# Patient Record
Sex: MALE | Race: WHITE | NOT HISPANIC OR LATINO | URBAN - METROPOLITAN AREA
[De-identification: names, ages, dates, MRNs, and addresses within clinical notes are randomized per-mention and may not be internally consistent; named-entity substitution may affect disease eponyms.]

---

## 2017-05-03 ENCOUNTER — OUTPATIENT (OUTPATIENT)
Dept: OUTPATIENT SERVICES | Facility: HOSPITAL | Age: 52
LOS: 1 days | Discharge: HOME | End: 2017-05-03

## 2017-05-03 DIAGNOSIS — J34.2 DEVIATED NASAL SEPTUM: Chronic | ICD-10-CM

## 2017-06-28 DIAGNOSIS — M54.2 CERVICALGIA: ICD-10-CM

## 2017-11-15 ENCOUNTER — OUTPATIENT (OUTPATIENT)
Dept: OUTPATIENT SERVICES | Facility: HOSPITAL | Age: 52
LOS: 1 days | Discharge: HOME | End: 2017-11-15

## 2017-11-15 DIAGNOSIS — J34.2 DEVIATED NASAL SEPTUM: Chronic | ICD-10-CM

## 2017-11-15 DIAGNOSIS — Z01.818 ENCOUNTER FOR OTHER PREPROCEDURAL EXAMINATION: ICD-10-CM

## 2017-11-15 DIAGNOSIS — M50.30 OTHER CERVICAL DISC DEGENERATION, UNSPECIFIED CERVICAL REGION: ICD-10-CM

## 2017-11-29 ENCOUNTER — INPATIENT (INPATIENT)
Facility: HOSPITAL | Age: 52
LOS: 0 days | Discharge: HOME | End: 2017-11-30
Attending: NEUROLOGICAL SURGERY

## 2017-11-29 DIAGNOSIS — J34.2 DEVIATED NASAL SEPTUM: Chronic | ICD-10-CM

## 2017-11-29 DIAGNOSIS — M50.30 OTHER CERVICAL DISC DEGENERATION, UNSPECIFIED CERVICAL REGION: ICD-10-CM

## 2017-12-03 ENCOUNTER — EMERGENCY (EMERGENCY)
Facility: HOSPITAL | Age: 52
LOS: 0 days | Discharge: HOME | End: 2017-12-03

## 2017-12-03 DIAGNOSIS — G89.18 OTHER ACUTE POSTPROCEDURAL PAIN: ICD-10-CM

## 2017-12-03 DIAGNOSIS — Z98.890 OTHER SPECIFIED POSTPROCEDURAL STATES: ICD-10-CM

## 2017-12-03 DIAGNOSIS — J34.2 DEVIATED NASAL SEPTUM: Chronic | ICD-10-CM

## 2017-12-03 DIAGNOSIS — R06.02 SHORTNESS OF BREATH: ICD-10-CM

## 2017-12-03 DIAGNOSIS — M50.30 OTHER CERVICAL DISC DEGENERATION, UNSPECIFIED CERVICAL REGION: ICD-10-CM

## 2017-12-03 DIAGNOSIS — R07.0 PAIN IN THROAT: ICD-10-CM

## 2017-12-03 DIAGNOSIS — R09.89 OTHER SPECIFIED SYMPTOMS AND SIGNS INVOLVING THE CIRCULATORY AND RESPIRATORY SYSTEMS: ICD-10-CM

## 2017-12-04 DIAGNOSIS — R00.0 TACHYCARDIA, UNSPECIFIED: ICD-10-CM
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
## 2017-12-04 DIAGNOSIS — M50.121 CERVICAL DISC DISORDER AT C4-C5 LEVEL WITH RADICULOPATHY: ICD-10-CM

## 2017-12-04 DIAGNOSIS — M54.2 CERVICALGIA: ICD-10-CM

## 2017-12-14 ENCOUNTER — EMERGENCY (EMERGENCY)
Facility: HOSPITAL | Age: 52
LOS: 0 days | Discharge: HOME | End: 2017-12-14

## 2017-12-14 DIAGNOSIS — R10.9 UNSPECIFIED ABDOMINAL PAIN: ICD-10-CM

## 2017-12-14 DIAGNOSIS — M50.30 OTHER CERVICAL DISC DEGENERATION, UNSPECIFIED CERVICAL REGION: ICD-10-CM

## 2017-12-14 DIAGNOSIS — K80.20 CALCULUS OF GALLBLADDER WITHOUT CHOLECYSTITIS WITHOUT OBSTRUCTION: ICD-10-CM

## 2017-12-14 DIAGNOSIS — J34.2 DEVIATED NASAL SEPTUM: Chronic | ICD-10-CM

## 2017-12-14 DIAGNOSIS — Z98.890 OTHER SPECIFIED POSTPROCEDURAL STATES: ICD-10-CM

## 2018-01-25 ENCOUNTER — OUTPATIENT (OUTPATIENT)
Dept: OUTPATIENT SERVICES | Facility: HOSPITAL | Age: 53
LOS: 1 days | Discharge: HOME | End: 2018-01-25

## 2018-01-25 DIAGNOSIS — J34.2 DEVIATED NASAL SEPTUM: Chronic | ICD-10-CM

## 2018-01-25 DIAGNOSIS — M54.2 CERVICALGIA: ICD-10-CM

## 2018-02-04 DIAGNOSIS — M50.30 OTHER CERVICAL DISC DEGENERATION, UNSPECIFIED CERVICAL REGION: ICD-10-CM

## 2018-02-26 ENCOUNTER — OUTPATIENT (OUTPATIENT)
Dept: OUTPATIENT SERVICES | Facility: HOSPITAL | Age: 53
LOS: 1 days | Discharge: HOME | End: 2018-02-26

## 2018-02-26 DIAGNOSIS — J34.2 DEVIATED NASAL SEPTUM: Chronic | ICD-10-CM

## 2018-02-26 DIAGNOSIS — M25.559 PAIN IN UNSPECIFIED HIP: ICD-10-CM

## 2018-03-11 ENCOUNTER — OUTPATIENT (OUTPATIENT)
Dept: OUTPATIENT SERVICES | Facility: HOSPITAL | Age: 53
LOS: 1 days | Discharge: HOME | End: 2018-03-11

## 2018-03-11 DIAGNOSIS — J18.9 PNEUMONIA, UNSPECIFIED ORGANISM: ICD-10-CM

## 2018-03-11 DIAGNOSIS — J34.2 DEVIATED NASAL SEPTUM: Chronic | ICD-10-CM

## 2018-03-25 ENCOUNTER — OUTPATIENT (OUTPATIENT)
Dept: OUTPATIENT SERVICES | Facility: HOSPITAL | Age: 53
LOS: 1 days | Discharge: HOME | End: 2018-03-25

## 2018-03-25 DIAGNOSIS — J34.2 DEVIATED NASAL SEPTUM: Chronic | ICD-10-CM

## 2018-03-25 DIAGNOSIS — J18.9 PNEUMONIA, UNSPECIFIED ORGANISM: ICD-10-CM

## 2018-04-10 ENCOUNTER — OUTPATIENT (OUTPATIENT)
Dept: OUTPATIENT SERVICES | Facility: HOSPITAL | Age: 53
LOS: 1 days | Discharge: HOME | End: 2018-04-10

## 2018-04-10 ENCOUNTER — APPOINTMENT (OUTPATIENT)
Dept: PULMONOLOGY | Facility: CLINIC | Age: 53
End: 2018-04-10

## 2018-04-10 VITALS
WEIGHT: 257 LBS | SYSTOLIC BLOOD PRESSURE: 140 MMHG | BODY MASS INDEX: 31.95 KG/M2 | HEART RATE: 89 BPM | OXYGEN SATURATION: 90 % | HEIGHT: 75 IN | DIASTOLIC BLOOD PRESSURE: 90 MMHG

## 2018-04-10 DIAGNOSIS — R05 COUGH: ICD-10-CM

## 2018-04-10 DIAGNOSIS — J34.2 DEVIATED NASAL SEPTUM: Chronic | ICD-10-CM

## 2018-04-10 RX ORDER — MELOXICAM 15 MG/1
15 TABLET ORAL
Qty: 30 | Refills: 0 | Status: COMPLETED | COMMUNITY
Start: 2017-12-06

## 2018-04-10 RX ORDER — PROMETHAZINE HYDROCHLORIDE AND DEXTROMETHORPHAN HYDROBROMIDE ORAL SOLUTION 15; 6.25 MG/5ML; MG/5ML
6.25-15 SOLUTION ORAL
Qty: 120 | Refills: 0 | Status: COMPLETED | COMMUNITY
Start: 2018-02-26

## 2018-04-10 RX ORDER — DIAZEPAM 5 MG/1
5 TABLET ORAL
Qty: 2 | Refills: 0 | Status: COMPLETED | COMMUNITY
Start: 2018-01-24

## 2018-04-10 RX ORDER — OXYCODONE AND ACETAMINOPHEN 5; 325 MG/1; MG/1
5-325 TABLET ORAL
Qty: 30 | Refills: 0 | Status: COMPLETED | COMMUNITY
Start: 2017-11-30

## 2018-04-10 RX ORDER — LORAZEPAM 0.5 MG/1
0.5 TABLET ORAL
Qty: 30 | Refills: 0 | Status: COMPLETED | COMMUNITY
Start: 2017-12-04

## 2018-04-10 RX ORDER — PROMETHAZINE HYDROCHLORIDE AND CODEINE PHOSPHATE 6.25; 1 MG/5ML; MG/5ML
6.25-1 SOLUTION ORAL
Qty: 180 | Refills: 0 | Status: COMPLETED | COMMUNITY
Start: 2018-03-14

## 2018-04-10 RX ORDER — METHYLPREDNISOLONE 4 MG/1
4 TABLET ORAL
Qty: 21 | Refills: 0 | Status: COMPLETED | COMMUNITY
Start: 2017-12-03

## 2018-04-10 RX ORDER — BENZONATATE 200 MG/1
200 CAPSULE ORAL
Qty: 20 | Refills: 0 | Status: COMPLETED | COMMUNITY
Start: 2018-03-25

## 2018-04-10 RX ORDER — CLINDAMYCIN HYDROCHLORIDE 300 MG/1
300 CAPSULE ORAL
Qty: 30 | Refills: 0 | Status: COMPLETED | COMMUNITY
Start: 2017-11-06

## 2018-04-12 RX ORDER — CLARITHROMYCIN 500 MG/1
500 TABLET, FILM COATED ORAL
Qty: 20 | Refills: 0 | Status: COMPLETED | COMMUNITY
Start: 2018-03-11

## 2018-04-12 RX ORDER — LEVOFLOXACIN 500 MG/1
500 TABLET, FILM COATED ORAL
Qty: 10 | Refills: 0 | Status: COMPLETED | COMMUNITY
Start: 2018-03-25

## 2018-04-17 ENCOUNTER — OUTPATIENT (OUTPATIENT)
Dept: OUTPATIENT SERVICES | Facility: HOSPITAL | Age: 53
LOS: 1 days | Discharge: HOME | End: 2018-04-17

## 2018-04-17 DIAGNOSIS — J34.2 DEVIATED NASAL SEPTUM: Chronic | ICD-10-CM

## 2018-04-17 DIAGNOSIS — R05 COUGH: ICD-10-CM

## 2018-04-17 DIAGNOSIS — M54.2 CERVICALGIA: ICD-10-CM

## 2018-05-17 ENCOUNTER — OUTPATIENT (OUTPATIENT)
Dept: OUTPATIENT SERVICES | Facility: HOSPITAL | Age: 53
LOS: 1 days | Discharge: HOME | End: 2018-05-17

## 2018-05-17 DIAGNOSIS — K22.4 DYSKINESIA OF ESOPHAGUS: ICD-10-CM

## 2018-05-17 DIAGNOSIS — J34.2 DEVIATED NASAL SEPTUM: Chronic | ICD-10-CM

## 2018-06-12 ENCOUNTER — OUTPATIENT (OUTPATIENT)
Dept: OUTPATIENT SERVICES | Facility: HOSPITAL | Age: 53
LOS: 1 days | Discharge: HOME | End: 2018-06-12

## 2018-06-12 DIAGNOSIS — R13.0 APHAGIA: ICD-10-CM

## 2018-06-12 DIAGNOSIS — J34.2 DEVIATED NASAL SEPTUM: Chronic | ICD-10-CM

## 2018-06-18 ENCOUNTER — INPATIENT (INPATIENT)
Facility: HOSPITAL | Age: 53
LOS: 3 days | Discharge: HOME | End: 2018-06-22
Attending: INTERNAL MEDICINE | Admitting: INTERNAL MEDICINE

## 2018-06-18 VITALS
WEIGHT: 253.53 LBS | RESPIRATION RATE: 16 BRPM | SYSTOLIC BLOOD PRESSURE: 105 MMHG | OXYGEN SATURATION: 100 % | HEART RATE: 113 BPM | DIASTOLIC BLOOD PRESSURE: 72 MMHG | HEIGHT: 72 IN

## 2018-06-18 DIAGNOSIS — J34.2 DEVIATED NASAL SEPTUM: Chronic | ICD-10-CM

## 2018-06-18 LAB
ALBUMIN SERPL ELPH-MCNC: 4.2 G/DL — SIGNIFICANT CHANGE UP (ref 3.5–5.2)
ALBUMIN SERPL ELPH-MCNC: 4.8 G/DL — SIGNIFICANT CHANGE UP (ref 3.5–5.2)
ALP SERPL-CCNC: 77 U/L — SIGNIFICANT CHANGE UP (ref 30–115)
ALP SERPL-CCNC: 92 U/L — SIGNIFICANT CHANGE UP (ref 30–115)
ALT FLD-CCNC: 34 U/L — SIGNIFICANT CHANGE UP (ref 0–41)
ALT FLD-CCNC: 40 U/L — SIGNIFICANT CHANGE UP (ref 0–41)
ANION GAP SERPL CALC-SCNC: 17 MMOL/L — HIGH (ref 7–14)
ANION GAP SERPL CALC-SCNC: 25 MMOL/L — HIGH (ref 7–14)
APTT BLD: 40.6 SEC — HIGH (ref 27–39.2)
AST SERPL-CCNC: 22 U/L — SIGNIFICANT CHANGE UP (ref 0–41)
AST SERPL-CCNC: 25 U/L — SIGNIFICANT CHANGE UP (ref 0–41)
BASE EXCESS BLDV CALC-SCNC: -5 MMOL/L — LOW (ref -2–2)
BASOPHILS # BLD AUTO: 0.07 K/UL — SIGNIFICANT CHANGE UP (ref 0–0.2)
BASOPHILS NFR BLD AUTO: 0.6 % — SIGNIFICANT CHANGE UP (ref 0–1)
BILIRUB SERPL-MCNC: 0.5 MG/DL — SIGNIFICANT CHANGE UP (ref 0.2–1.2)
BILIRUB SERPL-MCNC: 0.8 MG/DL — SIGNIFICANT CHANGE UP (ref 0.2–1.2)
BUN SERPL-MCNC: 17 MG/DL — SIGNIFICANT CHANGE UP (ref 10–20)
BUN SERPL-MCNC: 19 MG/DL — SIGNIFICANT CHANGE UP (ref 10–20)
CA-I SERPL-SCNC: 1.22 MMOL/L — SIGNIFICANT CHANGE UP (ref 1.12–1.3)
CALCIUM SERPL-MCNC: 8.9 MG/DL — SIGNIFICANT CHANGE UP (ref 8.5–10.1)
CALCIUM SERPL-MCNC: 9.8 MG/DL — SIGNIFICANT CHANGE UP (ref 8.5–10.1)
CHLORIDE SERPL-SCNC: 102 MMOL/L — SIGNIFICANT CHANGE UP (ref 98–110)
CHLORIDE SERPL-SCNC: 98 MMOL/L — SIGNIFICANT CHANGE UP (ref 98–110)
CHOLEST SERPL-MCNC: 215 MG/DL — HIGH (ref 100–200)
CK MB CFR SERPL CALC: 1.6 NG/ML — SIGNIFICANT CHANGE UP (ref 0.6–6.3)
CK MB CFR SERPL CALC: 2 NG/ML — SIGNIFICANT CHANGE UP (ref 0.6–6.3)
CK SERPL-CCNC: 101 U/L — SIGNIFICANT CHANGE UP (ref 0–225)
CK SERPL-CCNC: 101 U/L — SIGNIFICANT CHANGE UP (ref 0–225)
CO2 SERPL-SCNC: 16 MMOL/L — LOW (ref 17–32)
CO2 SERPL-SCNC: 22 MMOL/L — SIGNIFICANT CHANGE UP (ref 17–32)
CREAT SERPL-MCNC: 1.1 MG/DL — SIGNIFICANT CHANGE UP (ref 0.7–1.5)
CREAT SERPL-MCNC: 1.3 MG/DL — SIGNIFICANT CHANGE UP (ref 0.7–1.5)
EOSINOPHIL # BLD AUTO: 0.11 K/UL — SIGNIFICANT CHANGE UP (ref 0–0.7)
EOSINOPHIL NFR BLD AUTO: 0.9 % — SIGNIFICANT CHANGE UP (ref 0–8)
GAS PNL BLDV: 139 MMOL/L — SIGNIFICANT CHANGE UP (ref 136–145)
GAS PNL BLDV: SIGNIFICANT CHANGE UP
GLUCOSE SERPL-MCNC: 119 MG/DL — HIGH (ref 70–99)
GLUCOSE SERPL-MCNC: 80 MG/DL — SIGNIFICANT CHANGE UP (ref 70–99)
HCO3 BLDV-SCNC: 18 MMOL/L — LOW (ref 22–29)
HCT VFR BLD CALC: 49.2 % — SIGNIFICANT CHANGE UP (ref 42–52)
HCT VFR BLDA CALC: 56.4 % — HIGH (ref 34–44)
HDLC SERPL-MCNC: 35 MG/DL — LOW (ref 40–125)
HGB BLD CALC-MCNC: 18.4 G/DL — HIGH (ref 14–18)
HGB BLD-MCNC: 17.3 G/DL — SIGNIFICANT CHANGE UP (ref 14–18)
IMM GRANULOCYTES NFR BLD AUTO: 0.6 % — HIGH (ref 0.1–0.3)
INR BLD: 1.19 RATIO — SIGNIFICANT CHANGE UP (ref 0.65–1.3)
LACTATE BLDV-MCNC: 2 MMOL/L — HIGH (ref 0.5–1.6)
LIPID PNL WITH DIRECT LDL SERPL: 161 MG/DL — HIGH (ref 4–129)
LYMPHOCYTES # BLD AUTO: 18.6 % — LOW (ref 20.5–51.1)
LYMPHOCYTES # BLD AUTO: 2.27 K/UL — SIGNIFICANT CHANGE UP (ref 1.2–3.4)
MCHC RBC-ENTMCNC: 29.9 PG — SIGNIFICANT CHANGE UP (ref 27–31)
MCHC RBC-ENTMCNC: 35.2 G/DL — SIGNIFICANT CHANGE UP (ref 32–37)
MCV RBC AUTO: 85.1 FL — SIGNIFICANT CHANGE UP (ref 80–94)
MONOCYTES # BLD AUTO: 1.06 K/UL — HIGH (ref 0.1–0.6)
MONOCYTES NFR BLD AUTO: 8.7 % — SIGNIFICANT CHANGE UP (ref 1.7–9.3)
NEUTROPHILS # BLD AUTO: 8.62 K/UL — HIGH (ref 1.4–6.5)
NEUTROPHILS NFR BLD AUTO: 70.6 % — SIGNIFICANT CHANGE UP (ref 42.2–75.2)
NRBC # BLD: 0 /100 WBCS — SIGNIFICANT CHANGE UP (ref 0–0)
PCO2 BLDV: 27 MMHG — LOW (ref 41–51)
PH BLDV: 7.42 — SIGNIFICANT CHANGE UP (ref 7.26–7.43)
PLATELET # BLD AUTO: 473 K/UL — HIGH (ref 130–400)
PO2 BLDV: 27 MMHG — SIGNIFICANT CHANGE UP (ref 20–40)
POTASSIUM BLDV-SCNC: 4.4 MMOL/L — SIGNIFICANT CHANGE UP (ref 3.3–5.6)
POTASSIUM SERPL-MCNC: 4.7 MMOL/L — SIGNIFICANT CHANGE UP (ref 3.5–5)
POTASSIUM SERPL-MCNC: 4.8 MMOL/L — SIGNIFICANT CHANGE UP (ref 3.5–5)
POTASSIUM SERPL-SCNC: 4.7 MMOL/L — SIGNIFICANT CHANGE UP (ref 3.5–5)
POTASSIUM SERPL-SCNC: 4.8 MMOL/L — SIGNIFICANT CHANGE UP (ref 3.5–5)
PROT SERPL-MCNC: 7.2 G/DL — SIGNIFICANT CHANGE UP (ref 6–8)
PROT SERPL-MCNC: 8.2 G/DL — HIGH (ref 6–8)
PROTHROM AB SERPL-ACNC: 12.9 SEC — HIGH (ref 9.95–12.87)
RBC # BLD: 5.78 M/UL — SIGNIFICANT CHANGE UP (ref 4.7–6.1)
RBC # FLD: 12.9 % — SIGNIFICANT CHANGE UP (ref 11.5–14.5)
SAO2 % BLDV: 53 % — SIGNIFICANT CHANGE UP
SODIUM SERPL-SCNC: 139 MMOL/L — SIGNIFICANT CHANGE UP (ref 135–146)
SODIUM SERPL-SCNC: 141 MMOL/L — SIGNIFICANT CHANGE UP (ref 135–146)
TOTAL CHOLESTEROL/HDL RATIO MEASUREMENT: 6.1 RATIO — HIGH (ref 4–5.5)
TRIGL SERPL-MCNC: 73 MG/DL — SIGNIFICANT CHANGE UP (ref 10–149)
TROPONIN T SERPL-MCNC: <0.01 NG/ML — SIGNIFICANT CHANGE UP
TROPONIN T SERPL-MCNC: <0.01 NG/ML — SIGNIFICANT CHANGE UP
WBC # BLD: 12.2 K/UL — HIGH (ref 4.8–10.8)
WBC # FLD AUTO: 12.2 K/UL — HIGH (ref 4.8–10.8)

## 2018-06-18 RX ORDER — ENOXAPARIN SODIUM 100 MG/ML
40 INJECTION SUBCUTANEOUS EVERY 24 HOURS
Qty: 0 | Refills: 0 | Status: DISCONTINUED | OUTPATIENT
Start: 2018-06-18 | End: 2018-06-22

## 2018-06-18 RX ORDER — METHOCARBAMOL 500 MG/1
0 TABLET, FILM COATED ORAL
Qty: 0 | Refills: 0 | COMMUNITY

## 2018-06-18 RX ORDER — ACETAMINOPHEN 500 MG
650 TABLET ORAL EVERY 4 HOURS
Qty: 0 | Refills: 0 | Status: DISCONTINUED | OUTPATIENT
Start: 2018-06-18 | End: 2018-06-22

## 2018-06-18 RX ORDER — METHOCARBAMOL 500 MG/1
750 TABLET, FILM COATED ORAL THREE TIMES A DAY
Qty: 0 | Refills: 0 | Status: DISCONTINUED | OUTPATIENT
Start: 2018-06-18 | End: 2018-06-22

## 2018-06-18 RX ORDER — ASPIRIN/CALCIUM CARB/MAGNESIUM 324 MG
81 TABLET ORAL DAILY
Qty: 0 | Refills: 0 | Status: DISCONTINUED | OUTPATIENT
Start: 2018-06-18 | End: 2018-06-22

## 2018-06-18 NOTE — H&P ADULT - ATTENDING COMMENTS
Patient seen and examined independently. Agree with resident note/ history / physical exam and plan of care with following exceptions. Case discussed with house-staff, nursing and patient  pt is feeling better today  plan ccta  follow up cardio

## 2018-06-18 NOTE — H&P ADULT - NSHPLABSRESULTS_GEN_ALL_CORE
17.3   12.20 )-----------( 473      ( 18 Jun 2018 15:21 )             49.2       06-18    139  |  98  |  19  ----------------------------<  119<H>  4.8   |  16<L>  |  1.3    Ca    9.8      18 Jun 2018 15:21    TPro  8.2<H>  /  Alb  4.8  /  TBili  0.8  /  DBili  x   /  AST  25  /  ALT  40  /  AlkPhos  92  06-18            PT/INR - ( 18 Jun 2018 15:21 )   PT: 12.90 sec;   INR: 1.19 ratio    PTT - ( 18 Jun 2018 15:21 )  PTT:40.6 sec    Blood Gas Profile - Venous (06.18.18 @ 15:51)    pH, Venous: 7.42    pCO2, Venous: 27 mmHg    pO2, Venous: 27 mmHg    HCO3, Venous: 18 mmoL/L    Base Excess, Venous: -5.0 mmoL/L    Oxygen Saturation, Venous: 53 %    Blood Gas Venous - Lactate: 2.0 mmoL/L (06.18.18 @ 15:51)    CARDIAC MARKERS ( 18 Jun 2018 16:34 )  x     / <0.01 ng/mL / 101 U/L / x     / 1.6 ng/mL

## 2018-06-18 NOTE — H&P ADULT - NSHPSOCIALHISTORY_GEN_ALL_CORE
Marital Status:  (  x )    (   ) Single    (   )    (  )   Lives with: (  ) alone  (  ) children   ( x ) spouse   (  ) parents  (  ) other  Recent Travel: None    Substance Use (street drugs): ( x ) never used  (  ) other:  Tobacco Usage:  ( x  ) never smoked   (   ) former smoker   (   ) current smoker  (     ) pack year  Alcohol Usage: Denied

## 2018-06-18 NOTE — ED PROVIDER NOTE - CARE PLAN
Principal Discharge DX:	SVT (supraventricular tachycardia)  Secondary Diagnosis:	Chest pain  Secondary Diagnosis:	Shortness of breath

## 2018-06-18 NOTE — H&P ADULT - NSHPPHYSICALEXAM_GEN_ALL_CORE
Vital Signs Last 24 Hrs  T(C): 36.7 (18 Jun 2018 15:37), Max: 36.7 (18 Jun 2018 15:37)  T(F): 98 (18 Jun 2018 15:37), Max: 98 (18 Jun 2018 15:37)  HR: 93 (18 Jun 2018 18:15) (93 - 128)  BP: 108/68 (18 Jun 2018 18:15) (105/72 - 108/68)  BP(mean): --  RR: 20 (18 Jun 2018 18:15) (16 - 20)  SpO2: 98% (18 Jun 2018 18:15) (98% - 100%)      GENERAL: NAD, well-developed, Non-toxic, stated age   HEAD:  Atraumatic, Normocephalic  EYES: EOMI, conjunctiva and sclera clear  NECK: Supple, No JVD  CHEST/LUNG: mild inspiratory L chest wheezing   HEART: Regular rate and rhythm; s1, s2, No murmurs, rubs, or gallops  ABDOMEN: Soft, Nontender, Nondistended; Bowel sounds present, No rebound or guarding noted   EXTREMITIES:  No lower extremity edema or calf tenderness to palpation.  No clubbing or cyanosis  PSYCH: AAOx3  NEUROLOGY: non-focal  SKIN: No rashes or lesions

## 2018-06-18 NOTE — ED PROVIDER NOTE - PROGRESS NOTE DETAILS
ATTENDING NOTE:  52-year-old male history of intervertebral disc disease, anxiety now presents with chest pain. Patient states he began to have midsternal chest discomfort while exerting himself earlier today, pain got progressively worse, [place], denies radiation, denies modifying factors, associated palpitations, pallor and diaphoresis, denies other associated complaints at present. EMS was called and the patient was found to be in SVT which responded to 18 mg of adenosine and the patient was transported to the ED. , otherwise VSS, awake, alert, non-toxic appearing, sitting comfortably in stretcher, in NAD, ears clear, oropharynx clear, mmm, no JVD or bruit, no skin rash or lesions, chest CTAB, non-labored breathing, no w/r/r, +S1/S2, tachycardic with regular rhythm, no m/r/g, abdomen soft, NT, ND, +BS, no organomegaly or pulsatile masses, no peripheral edema or deformities, equal pulses upper and lower extremities, alert, clear speech and steady gait.

## 2018-06-18 NOTE — H&P ADULT - ASSESSMENT
52M w/no significant past medical history presents with sudden acute chest pain, palpitations, found to be in SVT.    SVT: Unclear etiology at this time. ?ischemic cause   Broke with Adenosine  Continue with tele  AM ekg    Chest Pain and palpitations: Atypical Chest pain, r.o acs  Will order stress test   Cardiology consult  Start ASA 81mg     Open anion gap and decreased Bicarb:   Will repeat BMP to make sure values are accurate     gi ppx: Not indicated  DVT ppx: Lovenox  Diet 52M w/no significant past medical history presents with sudden acute chest pain, palpitations, found to be in SVT.    SVT: Unclear etiology at this time. ?ischemic cause   Broke with Adenosine  Continue with tele  AM ekg    Chest Pain and palpitations: Atypical Chest pain, r.o acs  Will order stress test   Cardiology consult  Start ASA 81mg   Echo     Open anion gap and decreased Bicarb:   Will repeat BMP to make sure values are accurate     gi ppx: Not indicated  DVT ppx: Lovenox  Diet

## 2018-06-18 NOTE — H&P ADULT - HISTORY OF PRESENT ILLNESS
52M with Cevical Disc herniation presents from doctors office ofr chest pain and palpitations. Patient states he awoke this morning in this normal state of health. He had intercourse with his wife, after wards in the shower he began having substernal, non-radiating chest pain, progressive in intensity, pressure like associated with sweating. Nothing made the pain better. Patient initially thought it was anxiety but it worsened significantly when at his wife's doctor's office. EKG in the office showed SVT ~220, EMS had given the patient 6 and 12mg of adenosine which reduced his rate to 110. Patient currently states he stilll has a feeling of chest tightness though he feels significantly better than previously.   Patient had an episode of Pneumonia ~1 month ago which has resolved. Admits to mild sore throat and slight productive cough. No fever, chills, n/v/d loss of consciousness.   Family history is significant for extensive CAD/MI and heart disease on fathers side of the family. Many  younger, father lived to 62 though  of leukemia, not heart disease.

## 2018-06-18 NOTE — H&P ADULT - FAMILY HISTORY
Father  Still living? No  Family history of leukemia, Age at diagnosis: Age Unknown     Mother  Still living? Yes, Estimated age: Age Unknown  Family history of chronic obstructive lung disease, Age at diagnosis: Age Unknown     Aunt  Still living? No  Family history of early CAD, Age at diagnosis: Age Unknown

## 2018-06-18 NOTE — ED PROVIDER NOTE - OBJECTIVE STATEMENT
53 yo M with no pmhx, presents for evaluation of chest pain, onset today during intercourse, worsening PTA, associated with shortness of breath. NO fever, no chills, no headache, no nausea, no vomiting, no back pain. Patient states that today he was having sexual intercourse when he started having the chest pain, he let it go and then he was accompanying his wife to the doctor. At that time patient began to have worsening chest pain and had an EKG done at the office which showed SVT at 220 bmp. Patient was given 6, 12 of adenosine by EMS which brought the patient into 120. Patient denies any other symptoms.

## 2018-06-18 NOTE — H&P ADULT - NSHPREVIEWOFSYSTEMS_GEN_ALL_CORE
CONSTITUTIONAL: No weakness, fevers or chills  EYES/ENT: No visual changes;  No vertigo or throat pain   NECK: No pain or stiffness  RESPIRATORY: See HPI   CARDIOVASCULAR: See HPI   GASTROINTESTINAL: No abdominal or epigastric pain. No nausea, vomiting, or hematemesis; No diarrhea or constipation. No melena or hematochezia.  GENITOURINARY: No dysuria, frequency or hematuria  NEUROLOGICAL: No numbness or weakness  MUSCULOSKELETAL: No myalgias, arthralgias, or joint swelling  SKIN: No itching, rashes

## 2018-06-19 LAB
ANION GAP SERPL CALC-SCNC: 18 MMOL/L — HIGH (ref 7–14)
BASOPHILS # BLD AUTO: 0.09 K/UL — SIGNIFICANT CHANGE UP (ref 0–0.2)
BASOPHILS NFR BLD AUTO: 0.8 % — SIGNIFICANT CHANGE UP (ref 0–1)
BUN SERPL-MCNC: 16 MG/DL — SIGNIFICANT CHANGE UP (ref 10–20)
CALCIUM SERPL-MCNC: 8.9 MG/DL — SIGNIFICANT CHANGE UP (ref 8.5–10.1)
CHLORIDE SERPL-SCNC: 102 MMOL/L — SIGNIFICANT CHANGE UP (ref 98–110)
CK MB CFR SERPL CALC: 2 NG/ML — SIGNIFICANT CHANGE UP (ref 0.6–6.3)
CK SERPL-CCNC: 85 U/L — SIGNIFICANT CHANGE UP (ref 0–225)
CO2 SERPL-SCNC: 20 MMOL/L — SIGNIFICANT CHANGE UP (ref 17–32)
CREAT SERPL-MCNC: 1 MG/DL — SIGNIFICANT CHANGE UP (ref 0.7–1.5)
EOSINOPHIL # BLD AUTO: 0.14 K/UL — SIGNIFICANT CHANGE UP (ref 0–0.7)
EOSINOPHIL NFR BLD AUTO: 1.2 % — SIGNIFICANT CHANGE UP (ref 0–8)
GLUCOSE SERPL-MCNC: 97 MG/DL — SIGNIFICANT CHANGE UP (ref 70–99)
HCT VFR BLD CALC: 44.4 % — SIGNIFICANT CHANGE UP (ref 42–52)
HGB BLD-MCNC: 15.4 G/DL — SIGNIFICANT CHANGE UP (ref 14–18)
IMM GRANULOCYTES NFR BLD AUTO: 0.5 % — HIGH (ref 0.1–0.3)
LYMPHOCYTES # BLD AUTO: 1.87 K/UL — SIGNIFICANT CHANGE UP (ref 1.2–3.4)
LYMPHOCYTES # BLD AUTO: 16.5 % — LOW (ref 20.5–51.1)
MCHC RBC-ENTMCNC: 30.1 PG — SIGNIFICANT CHANGE UP (ref 27–31)
MCHC RBC-ENTMCNC: 34.7 G/DL — SIGNIFICANT CHANGE UP (ref 32–37)
MCV RBC AUTO: 86.7 FL — SIGNIFICANT CHANGE UP (ref 80–94)
MONOCYTES # BLD AUTO: 1.05 K/UL — HIGH (ref 0.1–0.6)
MONOCYTES NFR BLD AUTO: 9.3 % — SIGNIFICANT CHANGE UP (ref 1.7–9.3)
NEUTROPHILS # BLD AUTO: 8.14 K/UL — HIGH (ref 1.4–6.5)
NEUTROPHILS NFR BLD AUTO: 71.7 % — SIGNIFICANT CHANGE UP (ref 42.2–75.2)
PLATELET # BLD AUTO: 415 K/UL — HIGH (ref 130–400)
POTASSIUM SERPL-MCNC: 4.7 MMOL/L — SIGNIFICANT CHANGE UP (ref 3.5–5)
POTASSIUM SERPL-SCNC: 4.7 MMOL/L — SIGNIFICANT CHANGE UP (ref 3.5–5)
RBC # BLD: 5.12 M/UL — SIGNIFICANT CHANGE UP (ref 4.7–6.1)
RBC # FLD: 13 % — SIGNIFICANT CHANGE UP (ref 11.5–14.5)
SODIUM SERPL-SCNC: 140 MMOL/L — SIGNIFICANT CHANGE UP (ref 135–146)
T3 SERPL-MCNC: 105 NG/DL — SIGNIFICANT CHANGE UP (ref 80–200)
T4 AB SER-ACNC: 8.1 UG/DL — SIGNIFICANT CHANGE UP (ref 4.6–12)
TROPONIN T SERPL-MCNC: <0.01 NG/ML — SIGNIFICANT CHANGE UP
TSH SERPL-MCNC: 2.27 UIU/ML — SIGNIFICANT CHANGE UP (ref 0.27–4.2)
WBC # BLD: 11.35 K/UL — HIGH (ref 4.8–10.8)
WBC # FLD AUTO: 11.35 K/UL — HIGH (ref 4.8–10.8)

## 2018-06-19 RX ORDER — BENZOCAINE AND MENTHOL 5; 1 G/100ML; G/100ML
1 LIQUID ORAL
Qty: 0 | Refills: 0 | Status: DISCONTINUED | OUTPATIENT
Start: 2018-06-19 | End: 2018-06-22

## 2018-06-19 RX ORDER — SODIUM CHLORIDE 9 MG/ML
1000 INJECTION INTRAMUSCULAR; INTRAVENOUS; SUBCUTANEOUS
Qty: 0 | Refills: 0 | Status: DISCONTINUED | OUTPATIENT
Start: 2018-06-19 | End: 2018-06-20

## 2018-06-19 RX ORDER — VERAPAMIL HCL 240 MG
180 CAPSULE, EXTENDED RELEASE PELLETS 24 HR ORAL DAILY
Qty: 0 | Refills: 0 | Status: DISCONTINUED | OUTPATIENT
Start: 2018-06-19 | End: 2018-06-22

## 2018-06-19 RX ORDER — METOPROLOL TARTRATE 50 MG
5 TABLET ORAL ONCE
Qty: 0 | Refills: 0 | Status: COMPLETED | OUTPATIENT
Start: 2018-06-19 | End: 2018-06-19

## 2018-06-19 RX ADMIN — ENOXAPARIN SODIUM 40 MILLIGRAM(S): 100 INJECTION SUBCUTANEOUS at 06:10

## 2018-06-19 RX ADMIN — SODIUM CHLORIDE 75 MILLILITER(S): 9 INJECTION INTRAMUSCULAR; INTRAVENOUS; SUBCUTANEOUS at 14:42

## 2018-06-19 RX ADMIN — Medication 5 MILLIGRAM(S): at 11:13

## 2018-06-19 RX ADMIN — Medication 180 MILLIGRAM(S): at 14:42

## 2018-06-19 RX ADMIN — BENZOCAINE AND MENTHOL 1 LOZENGE: 5; 1 LIQUID ORAL at 23:59

## 2018-06-19 RX ADMIN — METHOCARBAMOL 750 MILLIGRAM(S): 500 TABLET, FILM COATED ORAL at 11:17

## 2018-06-19 RX ADMIN — Medication 81 MILLIGRAM(S): at 11:13

## 2018-06-19 NOTE — CONSULT NOTE ADULT - ASSESSMENT
Supraventricular tachycardia  - likely AVNRT by ekg analysis  - patient open to ablation  - will give verapamil SR upon discharge  - CCTA to define coronaries as patient had some pressure during episodes  - Electrophysiology evaluation  - 2d echo reviewed

## 2018-06-19 NOTE — CONSULT NOTE ADULT - SUBJECTIVE AND OBJECTIVE BOX
Date of Admission: 6/18    CHIEF COMPLAINT: palpitations/ chest pain    HISTORY OF PRESENT ILLNESS: 52yMale with PMH below presented to the hospital for rapid heart rate found at his [facility] doctors office. He was found to have a HR of 220 after having intercourse with his wife and started to feel some chest tightness and some fast heart rate. he thought he wwas having a panic attack and thought he would pass out. Has felt palpitations before this incident. En route to hospital had adenosine which broke his SVT. NO excessive snoring, no excessive coffee intake.     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 650 milliGRAM(s) Oral every 4 hours PRN Mild Pain (1 - 3)  methocarbamol 750 milliGRAM(s) Oral three times a day PRN muscle pain              SOCIAL HISTORY:    [ ] Non-smoker  [ ] Smoker  [ ] Alcohol    PHYSICAL EXAM:  T(C): 36.8 (06-18-18 @ 23:32), Max: 36.8 (06-18-18 @ 23:32)  HR: 93 (06-18-18 @ 23:32) (92 - 128)  BP: 131/71 (06-18-18 @ 23:32) (105/72 - 155/73)  RR: 18 (06-18-18 @ 23:32) (16 - 20)  SpO2: 98% (06-18-18 @ 23:32) (98% - 100%)  Wt(kg): --  I&O's Summary    Daily Height in cm: 182.88 (18 Jun 2018 15:07)    Daily     General Appearance: Normal, poor dentition  Cardiovascular: Normal S1 S2, No JVD, No murmurs, No edema  Respiratory: Lungs clear to auscultation	  Psychiatry: A & O x 3, Mood & affect appropriate  Gastrointestinal:  Soft, Non-tender  Skin: No rashes, No ecchymoses, No cyanosis	  Neurologic: Non-focal  Extremities: Normal range of motion, No clubbing, cyanosis or edema  Vascular: Peripheral pulses palpable 2+ bilaterally        LABS:	 	                          15.4   11.35 )-----------( 415      ( 19 Jun 2018 08:25 )             44.4     06-19    140  |  102  |  16  ----------------------------<  97  4.7   |  20  |  1.0    Ca    8.9      19 Jun 2018 08:25    TPro  7.2  /  Alb  4.2  /  TBili  0.5  /  DBili  x   /  AST  22  /  ALT  34  /  AlkPhos  77  06-18    CARDIAC MARKERS ( 19 Jun 2018 00:21 )  x     / <0.01 ng/mL / 85 U/L / x     / 2.0 ng/mL  CARDIAC MARKERS ( 18 Jun 2018 21:39 )  x     / <0.01 ng/mL / 101 U/L / x     / 2.0 ng/mL  CARDIAC MARKERS ( 18 Jun 2018 16:34 )  x     / <0.01 ng/mL / 101 U/L / x     / 1.6 ng/mL      PT/INR - ( 18 Jun 2018 15:21 )   PT: 12.90 sec;   INR: 1.19 ratio         PTT - ( 18 Jun 2018 15:21 )  PTT:40.6 sec        TELEMETRY EVENTS: 	NSR    ECG:  	sinus tach. in [facility]'s office: SVT at 221 bpm. likely AVNRT  RADIOLOGY:  OTHER: 	    PREVIOUS DIAGNOSTIC TESTING:    [x ] Echocardiogram: < from: Transthoracic Echocardiogram (06.19.18 @ 09:22) >  Summary:   1. Normal left ventricular internal cavity size.   2. Spectral Doppler shows impaired relaxation pattern of left   ventricular myocardial filling (Grade I diastolic dysfunction).   3. Moderately enlarged left atrium.   4. Normal right atrial size.   5. Mild mitral annular calcification.   6. Thickening of the anterior and posterior mitral valve leaflets.   7. Trace tricuspid regurgitation.    < end of copied text >    [ ]  Catheterization:  [ ] Stress Test:  	  	  ASSESSMENT/PLAN:

## 2018-06-20 RX ORDER — METOPROLOL TARTRATE 50 MG
100 TABLET ORAL ONCE
Qty: 0 | Refills: 0 | Status: COMPLETED | OUTPATIENT
Start: 2018-06-20 | End: 2018-06-20

## 2018-06-20 RX ADMIN — Medication 180 MILLIGRAM(S): at 05:40

## 2018-06-20 RX ADMIN — BENZOCAINE AND MENTHOL 1 LOZENGE: 5; 1 LIQUID ORAL at 05:40

## 2018-06-20 RX ADMIN — Medication 100 MILLIGRAM(S): at 11:38

## 2018-06-20 RX ADMIN — ENOXAPARIN SODIUM 40 MILLIGRAM(S): 100 INJECTION SUBCUTANEOUS at 05:40

## 2018-06-20 RX ADMIN — BENZOCAINE AND MENTHOL 1 LOZENGE: 5; 1 LIQUID ORAL at 11:39

## 2018-06-20 RX ADMIN — Medication 100 MILLIGRAM(S): at 14:18

## 2018-06-20 RX ADMIN — BENZOCAINE AND MENTHOL 1 LOZENGE: 5; 1 LIQUID ORAL at 18:10

## 2018-06-20 RX ADMIN — BENZOCAINE AND MENTHOL 1 LOZENGE: 5; 1 LIQUID ORAL at 23:55

## 2018-06-20 RX ADMIN — Medication 81 MILLIGRAM(S): at 11:39

## 2018-06-20 RX ADMIN — Medication 650 MILLIGRAM(S): at 21:50

## 2018-06-21 ENCOUNTER — TRANSCRIPTION ENCOUNTER (OUTPATIENT)
Age: 53
End: 2018-06-21

## 2018-06-21 DIAGNOSIS — I47.1 SUPRAVENTRICULAR TACHYCARDIA: ICD-10-CM

## 2018-06-21 LAB
ANION GAP SERPL CALC-SCNC: 14 MMOL/L — SIGNIFICANT CHANGE UP (ref 7–14)
BASOPHILS # BLD AUTO: 0.07 K/UL — SIGNIFICANT CHANGE UP (ref 0–0.2)
BASOPHILS NFR BLD AUTO: 0.7 % — SIGNIFICANT CHANGE UP (ref 0–1)
BUN SERPL-MCNC: 18 MG/DL — SIGNIFICANT CHANGE UP (ref 10–20)
CALCIUM SERPL-MCNC: 9.5 MG/DL — SIGNIFICANT CHANGE UP (ref 8.5–10.1)
CHLORIDE SERPL-SCNC: 102 MMOL/L — SIGNIFICANT CHANGE UP (ref 98–110)
CO2 SERPL-SCNC: 25 MMOL/L — SIGNIFICANT CHANGE UP (ref 17–32)
CREAT SERPL-MCNC: 1 MG/DL — SIGNIFICANT CHANGE UP (ref 0.7–1.5)
EOSINOPHIL # BLD AUTO: 0.17 K/UL — SIGNIFICANT CHANGE UP (ref 0–0.7)
EOSINOPHIL NFR BLD AUTO: 1.6 % — SIGNIFICANT CHANGE UP (ref 0–8)
GLUCOSE SERPL-MCNC: 96 MG/DL — SIGNIFICANT CHANGE UP (ref 70–99)
HCT VFR BLD CALC: 44.4 % — SIGNIFICANT CHANGE UP (ref 42–52)
HGB BLD-MCNC: 15.7 G/DL — SIGNIFICANT CHANGE UP (ref 14–18)
IMM GRANULOCYTES NFR BLD AUTO: 0.6 % — HIGH (ref 0.1–0.3)
LYMPHOCYTES # BLD AUTO: 19.7 % — LOW (ref 20.5–51.1)
LYMPHOCYTES # BLD AUTO: 2.04 K/UL — SIGNIFICANT CHANGE UP (ref 1.2–3.4)
MAGNESIUM SERPL-MCNC: 2.1 MG/DL — SIGNIFICANT CHANGE UP (ref 1.8–2.4)
MCHC RBC-ENTMCNC: 30.4 PG — SIGNIFICANT CHANGE UP (ref 27–31)
MCHC RBC-ENTMCNC: 35.4 G/DL — SIGNIFICANT CHANGE UP (ref 32–37)
MCV RBC AUTO: 86 FL — SIGNIFICANT CHANGE UP (ref 80–94)
MONOCYTES # BLD AUTO: 0.97 K/UL — HIGH (ref 0.1–0.6)
MONOCYTES NFR BLD AUTO: 9.3 % — SIGNIFICANT CHANGE UP (ref 1.7–9.3)
NEUTROPHILS # BLD AUTO: 7.07 K/UL — HIGH (ref 1.4–6.5)
NEUTROPHILS NFR BLD AUTO: 68.1 % — SIGNIFICANT CHANGE UP (ref 42.2–75.2)
PLATELET # BLD AUTO: 409 K/UL — HIGH (ref 130–400)
POTASSIUM SERPL-MCNC: 4.4 MMOL/L — SIGNIFICANT CHANGE UP (ref 3.5–5)
POTASSIUM SERPL-SCNC: 4.4 MMOL/L — SIGNIFICANT CHANGE UP (ref 3.5–5)
RBC # BLD: 5.16 M/UL — SIGNIFICANT CHANGE UP (ref 4.7–6.1)
RBC # FLD: 12.8 % — SIGNIFICANT CHANGE UP (ref 11.5–14.5)
SODIUM SERPL-SCNC: 141 MMOL/L — SIGNIFICANT CHANGE UP (ref 135–146)
WBC # BLD: 10.38 K/UL — SIGNIFICANT CHANGE UP (ref 4.8–10.8)
WBC # FLD AUTO: 10.38 K/UL — SIGNIFICANT CHANGE UP (ref 4.8–10.8)

## 2018-06-21 RX ORDER — ASPIRIN/CALCIUM CARB/MAGNESIUM 324 MG
1 TABLET ORAL
Qty: 30 | Refills: 0 | OUTPATIENT
Start: 2018-06-21

## 2018-06-21 RX ORDER — ADENOSINE 3 MG/ML
60 INJECTION INTRAVENOUS ONCE
Qty: 0 | Refills: 0 | Status: DISCONTINUED | OUTPATIENT
Start: 2018-06-21 | End: 2018-06-22

## 2018-06-21 RX ORDER — VERAPAMIL HCL 240 MG
1 CAPSULE, EXTENDED RELEASE PELLETS 24 HR ORAL
Qty: 30 | Refills: 0 | OUTPATIENT
Start: 2018-06-21

## 2018-06-21 RX ADMIN — BENZOCAINE AND MENTHOL 1 LOZENGE: 5; 1 LIQUID ORAL at 23:18

## 2018-06-21 RX ADMIN — BENZOCAINE AND MENTHOL 1 LOZENGE: 5; 1 LIQUID ORAL at 17:45

## 2018-06-21 RX ADMIN — Medication 650 MILLIGRAM(S): at 22:34

## 2018-06-21 RX ADMIN — BENZOCAINE AND MENTHOL 1 LOZENGE: 5; 1 LIQUID ORAL at 11:31

## 2018-06-21 RX ADMIN — ENOXAPARIN SODIUM 40 MILLIGRAM(S): 100 INJECTION SUBCUTANEOUS at 05:49

## 2018-06-21 RX ADMIN — BENZOCAINE AND MENTHOL 1 LOZENGE: 5; 1 LIQUID ORAL at 05:49

## 2018-06-21 RX ADMIN — Medication 81 MILLIGRAM(S): at 11:30

## 2018-06-21 RX ADMIN — Medication 180 MILLIGRAM(S): at 05:49

## 2018-06-21 NOTE — DISCHARGE NOTE ADULT - CARE PLAN
Principal Discharge DX:	SVT (supraventricular tachycardia)  Goal:	control heart rate  Assessment and plan of treatment:	take meds as instructed  follow up with cardiology and eps as OP Principal Discharge DX:	SVT (supraventricular tachycardia)  Goal:	control heart rate  Assessment and plan of treatment:	take verapamil daily  follow up with dr valera for EP study and ablation as outpatient

## 2018-06-21 NOTE — DISCHARGE NOTE ADULT - NS AS DC STROKE ED MATERIALS
Call 911 for Stroke/Risk Factors for Stroke/Stroke Warning Signs and Symptoms/Prescribed Medications/Stroke Education Booklet/Need for Followup After Discharge

## 2018-06-21 NOTE — CONSULT NOTE ADULT - PROBLEM SELECTOR RECOMMENDATION 9
PALPITATIONS  DESCRIBED AS HEART RACING FOR MANY YRS PT THOUGHT THEY WERE ANXIETY EPISODES  .EPISODES OCCASIONALLY  ASSOCIATED WITH LIGHT HEADED NESS,  AND  SYNCOPE .  RECOMMEND ATION  MOST LIKELY EPISODES ARE VT  AVNRT MECHANISM VS CONCEALED BYPASS VS AT  EP STUDY/ABLATION PT WILL BE SCHEDULED FOR PROCEDURE AS OUT PATIENT  AGREE WITH VERAPAMIL  MG/DAY

## 2018-06-21 NOTE — DISCHARGE NOTE ADULT - CARE PROVIDER_API CALL
Brandon Siegel), Cardiovascular Disease; Interventional Cardiology  [address] [address]  Phone: [phone]  Fax: [phone]    Kalen Mason), Cardiac Electrophysiology; Cardiovascular Disease; [facility] Medicine  [facility] [place] [address]  Phone: [phone]  Fax: [phone] Brandon Siegel), Cardiovascular Disease; Interventional Cardiology  [address] [place] [address]  Phone: [phone]  Fax: [phone]    Melvin Diaz), Cardiology; Internal Medicine  [address] [place] [address]  Phone: [phone]  Fax: [phone]

## 2018-06-21 NOTE — DISCHARGE NOTE ADULT - MEDICATION SUMMARY - MEDICATIONS TO TAKE
I will START or STAY ON the medications listed below when I get home from the hospital:    aspirin 81 mg oral delayed release tablet  -- 1 tab(s) by mouth once a day  -- Indication: For SVT (supraventricular tachycardia)    verapamil 180 mg/12 hours oral tablet, extended release  -- 1 tab(s) by mouth once a day  -- Indication: For SVT (supraventricular tachycardia)    Robaxin-750 oral tablet  -- 1 tab(s) by mouth 3 times a day, As Needed  -- Indication: For Chest pain

## 2018-06-21 NOTE — DISCHARGE NOTE ADULT - PLAN OF CARE
control heart rate take meds as instructed  follow up with cardiology and eps as OP take verapamil daily  follow up with dr valera for EP study and ablation as outpatient

## 2018-06-21 NOTE — DISCHARGE NOTE ADULT - PATIENT PORTAL LINK FT
You can access the [facility] Patient Portal, offered by [facility], by registering with the following website: [email or web address]

## 2018-06-21 NOTE — DISCHARGE NOTE ADULT - PROVIDER TOKENS
ANDREY:'[id number]',ANDREY:'[id number]' ANDREY:'[id number]',ANDREY:'[id number]'

## 2018-06-21 NOTE — CONSULT NOTE ADULT - SUBJECTIVE AND OBJECTIVE BOX
HPI:  52M with Cevical Disc herniation presents from doctors office ofr chest pain and palpitations. Patient states he awoke this morning in this normal state of health. He had intercourse with his wife, after wards in the shower he began having substernal, non-radiating chest pain, progressive in intensity, pressure like associated with sweating. Nothing made the pain better. Patient initially thought it was anxiety but it worsened significantly when at his wife's doctor's office. EKG in the office showed SVT ~220, EMS had given the patient 6 and 12mg of adenosine which reduced his rate to 110. Patient currently states he stilll has a feeling of chest tightness though he feels significantly better than previously.   Patient had an episode of Pneumonia ~1 month ago which has resolved. Admits to mild sore throat and slight productive cough. No fever, chills, n/v/d loss of consciousness.   Family history is significant for extensive CAD/MI and heart disease on fathers side of the family. Many  younger, father lived to 62 though  of leukemia, not heart disease. 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 Normal left ventricular internal cavity size.   2. Spectral Doppler shows impaired relaxation pattern of left   ventricular myocardial filling (Grade I diastolic dysfunction).   3. Moderately enlarged left atrium.   4. Normal right atrial size.   5. Mild mitral annular calcification.   6. Thickening of the anterior and posterior mitral valve leaflets.   7. Trace tricuspid regurgitation.  < end of copied text >      STRESS  FINDINGS: EXAM:  NM NUCLEAR STRESS MULTI PHARM        PROCEDURE DATE:  2018    Impression:   1. NORMAL ADENOSINE / REST MYOCARDIAL PERFUSION TOMOGRAPHY, WITH NO   EVIDENCE FOR ISCHEMIA DURING ADENOSINE INFUSION.   2. NORMAL RESTING LEFT VENTRICULAR WALL MOTION AND WALL THICKENING.  3. LEFT VENTRICULAR EJECTION FRACTION OF  67 % WHICH IS WITHIN RANGE OF   NORMAL.     MEDICATIONS  (STANDING):  adenosine Injectable (ADENOSCAN) 60 milliGRAM(s) IV Bolus once  aspirin enteric coated 81 milliGRAM(s) Oral daily  benzocaine 15 mG/menthol 3.6 mG Lozenge 1 Lozenge Oral four times a day  enoxaparin Injectable 40 milliGRAM(s) SubCutaneous every 24 hours  verapamil  milliGRAM(s) Oral daily    MEDICATIONS  (PRN):  acetaminophen   Tablet. 650 milliGRAM(s) Oral every 4 hours PRN Mild Pain (1 - 3)  methocarbamol 750 milliGRAM(s) Oral three times a day PRN muscle pain   Home Medications:  Robaxin-750 oral tablet: 1 tab(s) orally 3 times a day, As Needed (2018 19:32)    FAMILY HISTORY:  Family history of early CAD (Aunt)  Family history of leukemia (Father): father  Family history of chronic obstructive lung disease (Mother): mother    SOCIAL HISTORY:  CIGARETTES:  ALCOHOL:    Vital Signs Last 24 Hrs  T(C): 36.6 (2018 14:03), Max: 36.7 (2018 20:10)  T(F): 97.8 (2018 14:03), Max: 98.1 (2018 20:10)  HR: 87 (2018 14:03) (68 - 87)  BP: 120/60 (2018 14:03) (111/67 - 124/81)  BP(mean): --  RR: 18 (2018 14:03) (18 - 20)  SpO2: 99% (2018 21:14) (99% - 99%)    INTERPRETATION OF TELEMETRY:    ECG: < from: 12 Lead ECG (18 @ 15:18) >  Ventricular Rate 118 BPM  Atrial Rate 118 BPM  P-R Interval 158 ms  QRS Duration 94 ms  Q-T Interval 310 ms  QTC Calculation(Bezet) 434 ms  P Axis 48 degrees  R Axis 39 degrees  T Axis 32 degrees  Diagnosis Line Sinus tachycardia  Low voltage QRS limb leads  Borderline ECG  < end of copied text >    LABS:                      15.7   10.38 )-----------( 409      ( 2018 08:12 )             44.4     -    141  |  102  |  18  ----------------------------<  96  4.4   |  25  |  1.0    Ca    9.5      2018 08:12  Mg     2.1

## 2018-06-21 NOTE — DISCHARGE NOTE ADULT - CARE PROVIDERS DIRECT ADDRESSES
,DirectAddress_Unknown,[email or web address] ,DirectAddress_Unknown,DirectAddress_Unknown

## 2018-06-21 NOTE — DISCHARGE NOTE ADULT - HOSPITAL COURSE
52M with Cevical Disc herniation presents from doctors office ofr chest pain and palpitations. Patient states he awoke this morning in this normal state of health. He had intercourse with his wife, after wards in the shower he began having substernal, non-radiating chest pain, progressive in intensity, pressure like associated with sweating. Nothing made the pain better. Patient initially thought it was anxiety but it worsened significantly when at his wife's doctor's office. EKG in the office showed SVT ~220, EMS had given the patient 6 and 12mg of adenosine which reduced his rate to 110. Patient currently states he stilll has a feeling of chest tightness though he feels significantly better than previously.   Patient had an episode of Pneumonia ~1 month ago which has resolved. Admits to mild sore throat and slight productive cough. No fever, chills, n/v/d loss of consciousness.   Family history is significant for extensive CAD/MI and heart disease on fathers side of the family. Many  younger, father lived to 62 though  of leukemia, not heart disease.   pt was seen by cardiology who recommended CCT that was cancelled dt difficulty lowerig heart rate. stresstest was done and showed no obstructive lesions  ep to see pt and assess need for ablation 52M with Cevical Disc herniation presents from doctors office ofr chest pain and palpitations. Patient states he awoke this morning in this normal state of health. He had intercourse with his wife, after wards in the shower he began having substernal, non-radiating chest pain, progressive in intensity, pressure like associated with sweating. Nothing made the pain better. Patient initially thought it was anxiety but it worsened significantly when at his wife's doctor's office. EKG in the office showed SVT ~220, EMS had given the patient 6 and 12mg of adenosine which reduced his rate to 110. Patient currently states he stilll has a feeling of chest tightness though he feels significantly better than previously.   Patient had an episode of Pneumonia ~1 month ago which has resolved. Admits to mild sore throat and slight productive cough. No fever, chills, n/v/d loss of consciousness.   Family history is significant for extensive CAD/MI and heart disease on fathers side of the family. Many  younger, father lived to 62 though  of leukemia, not heart disease.   pt was seen by cardiology who recommended CCT that was cancelled dt difficulty lowerig heart rate. stresstest was done and showed no obstructive lesions  eps were consulted and recommended OP EP study and ablation.

## 2018-06-22 VITALS
DIASTOLIC BLOOD PRESSURE: 63 MMHG | TEMPERATURE: 97 F | HEART RATE: 72 BPM | RESPIRATION RATE: 18 BRPM | SYSTOLIC BLOOD PRESSURE: 105 MMHG

## 2018-06-22 RX ORDER — CEFPODOXIME PROXETIL 100 MG
1 TABLET ORAL
Qty: 8 | Refills: 0 | OUTPATIENT
Start: 2018-06-22

## 2018-06-22 RX ADMIN — ENOXAPARIN SODIUM 40 MILLIGRAM(S): 100 INJECTION SUBCUTANEOUS at 06:06

## 2018-06-22 RX ADMIN — BENZOCAINE AND MENTHOL 1 LOZENGE: 5; 1 LIQUID ORAL at 06:06

## 2018-06-22 RX ADMIN — Medication 81 MILLIGRAM(S): at 11:35

## 2018-06-22 RX ADMIN — BENZOCAINE AND MENTHOL 1 LOZENGE: 5; 1 LIQUID ORAL at 11:36

## 2018-06-22 RX ADMIN — Medication 180 MILLIGRAM(S): at 06:06

## 2018-06-22 NOTE — PROGRESS NOTE ADULT - ASSESSMENT
52M w/no significant past medical history presents with sudden acute chest pain, palpitations, found to be in SVT.    SVT: Unclear etiology at this time.   Broke with Adenosine  Continue with tele  As per cardio CTA coronary   pt will require ablation    Chest Pain and palpitations: Atypical Chest pain, r.o acs  Will order stress test   Cardiology consult  Start ASA 81mg   Echo     Open anion gap and decreased Bicarb:   Will repeat BMP to make sure values are accurate     gi ppx: Not indicated  DVT ppx: Lovenox  Diet
52M with h/o anxiety presents with sudden acute chest pain, palpitations, found to be in SVT.    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 CEx3 negative/ CT coronaries not performed as meds failed to control HR below 60  pt scheduled for nuclear stress test today. will fu results  fu electrophysiology recs for possible ablation   c/w verapamil    # GI/DVT ppx    # full ocde  from home  fully functional
52M with h/o anxiety presents with sudden acute chest pain, palpitations, found to be in SVT.    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 CEx3 negative/ fu CT coronary results  fu electrophysiology recs for possible ablation   c/w verapamil    # mild HAGMA on admission  resolved with IVfs. will dc  unclear etiology    # GI/DVT ppx    # full ocde  from home  fully functional
52M with h/o anxiety presents with sudden acute chest pain, palpitations, found to be in SVT.    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 CEx3 negative/ fu CT coronary --    could not be done as HR was not below 70 so stress test was done  stress< from: NM Nuclear Stress Pharmacologic Multiple (06.21.18 @ 13:08) >  1. NORMAL ADENOSINE / REST MYOCARDIAL PERFUSION TOMOGRAPHY, WITH NO   EVIDENCE FOR ISCHEMIA DURING ADENOSINE INFUSION.   2. NORMAL RESTING LEFT VENTRICULAR WALL MOTION AND WALL THICKENING.  3. LEFT VENTRICULAR EJECTION FRACTION OF  67 % WHICH IS WITHIN RANGE OF   NORMAL.     fu electrophysiology possible ablation on monday as outpatient.  c/w verapamil    # Mild cough-- patient insisted on taking abx-- vantin prescribed.  DC home today- spent more than 30mins
52M with h/o anxiety presents with sudden acute chest pain, palpitations, found to be in SVT.    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 CEx3 negative/ fu CT coronary --    could not be done as HR was not below 70 so stress test was done  stress< from: NM Nuclear Stress Pharmacologic Multiple (06.21.18 @ 13:08) >  1. NORMAL ADENOSINE / REST MYOCARDIAL PERFUSION TOMOGRAPHY, WITH NO   EVIDENCE FOR ISCHEMIA DURING ADENOSINE INFUSION.   2. NORMAL RESTING LEFT VENTRICULAR WALL MOTION AND WALL THICKENING.  3. LEFT VENTRICULAR EJECTION FRACTION OF  67 % WHICH IS WITHIN RANGE OF   NORMAL.     fu electrophysiology recs for possible ablation   c/w verapamil    # mild HAGMA on admission  resolved with IVfs. will dc  unclear etiology
52M with h/o anxiety presents with sudden acute chest pain, palpitations, found to be in SVT.    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 CEx3 negative/ fu CT coronary -- trying to reduce HR for CTA coronary.  fu electrophysiology recs for possible ablation   c/w verapamil    # mild HAGMA on admission  resolved with IVfs. will dc  unclear etiology

## 2018-06-22 NOTE — PROGRESS NOTE ADULT - SUBJECTIVE AND OBJECTIVE BOX
LENGTH OF HOSPITAL STAY: 1d    CHIEF COMPLAINT:   Patient is a 52y old  Male who presents with a chief complaint of Chest pain and palpitations       HISTORY OF PRESENTING ILLNESS:    HPI:  52M with Cevical Disc herniation presents from doctors office ofr chest pain and palpitations. Patient states he awoke this morning in this normal state of health. He had intercourse with his wife, after wards in the shower he began having substernal, non-radiating chest pain, progressive in intensity, pressure like associated with sweating. Nothing made the pain better. Patient initially thought it was anxiety but it worsened significantly when at his wife's doctor's office. EKG in the office showed SVT ~220, EMS had given the patient 6 and 12mg of adenosine which reduced his rate to 110. Patient currently states he stilll has a feeling of chest tightness though he feels significantly better than previously.   Patient had an episode of Pneumonia ~1 month ago which has resolved. Admits to mild sore throat and slight productive cough. No fever, chills, n/v/d loss of consciousness.   Family history is significant for extensive CAD/MI and heart disease on fathers side of the family. Many  younger, father lived to 62 though  of leukemia, not heart disease. 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 1000 milliLiter(s) IV Continuous <Continuous>  verapamil  milliGRAM(s) Oral daily    PRN MEDICATIONS  acetaminophen   Tablet. 650 milliGRAM(s) Oral every 4 hours PRN  methocarbamol 750 milliGRAM(s) Oral three times a day PRN    VITALS:   T(F): 98.3  HR: 73  BP: 121/83  RR: 18  SpO2: 98%    LABS:                        15.4   11.35 )-----------( 415      ( 2018 08:25 )             44.4     06-19    140  |  102  |  16  ----------------------------<  97  4.7   |  20  |  1.0    Ca    8.9      2018 08:25    TPro  7.2  /  Alb  4.2  /  TBili  0.5  /  DBili  x   /  AST  22  /  ALT  34  /  AlkPhos  77      PT/INR - ( 2018 15:21 )   PT: 12.90 sec;   INR: 1.19 ratio         PTT - ( 2018 15:21 )  PTT:40.6 sec      Creatine Kinase, Serum: 85 U/L (18 @ 00:21)  Troponin T, Serum: <0.01 ng/mL (18 @ 00:21)  Creatine Kinase, Serum: 101 U/L (18 @ 21:39)  Troponin T, Serum: <0.01 ng/mL (18 @ 21:39)  Creatine Kinase, Serum: 101 U/L (18 @ 16:34)  Troponin T, Serum: <0.01 ng/mL (18 @ 16:34)      CARDIAC MARKERS ( 2018 00:21 )  x     / <0.01 ng/mL / 85 U/L / x     / 2.0 ng/mL  CARDIAC MARKERS ( 2018 21:39 )  x     / <0.01 ng/mL / 101 U/L / x     / 2.0 ng/mL  CARDIAC MARKERS ( 2018 16:34 )  x     / <0.01 ng/mL / 101 U/L / x     / 1.6 ng/mL      PHYSICAL EXAM:  GEN: No acute distress    LUNGS: Clear to auscultation bilaterally   HEART: S1/S2 present. RRR.   ABD: Soft, non-tender, non-distended. Bowel sounds present  NEURO: AAOX3
SUBJECTIVE:    Patient is a 52y old Male who presents with a chief complaint of Chest pain and palpitations (18 Jun 2018 18:59)    Currently admitted to medicine with the primary diagnosis of SVT (supraventricular tachycardia)     Today is hospital day 2d. This morning he is resting comfortably in bed and reports no new issues or overnight events.     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     ALLERGIES:  No Known Allergies    MEDICATIONS:  STANDING MEDICATIONS  aspirin enteric coated 81 milliGRAM(s) Oral daily  benzocaine 15 mG/menthol 3.6 mG Lozenge 1 Lozenge Oral four times a day  enoxaparin Injectable 40 milliGRAM(s) SubCutaneous every 24 hours  verapamil  milliGRAM(s) Oral daily    PRN MEDICATIONS  acetaminophen   Tablet. 650 milliGRAM(s) Oral every 4 hours PRN  methocarbamol 750 milliGRAM(s) Oral three times a day PRN    VITALS:   T(F): 96.3  HR: 80  BP: 125/73  RR: 16  SpO2: 100%    LABS:                        15.4   11.35 )-----------( 415      ( 19 Jun 2018 08:25 )             44.4     06-19    140  |  102  |  16  ----------------------------<  97  4.7   |  20  |  1.0    Ca    8.9      19 Jun 2018 08:25    TPro  7.2  /  Alb  4.2  /  TBili  0.5  /  DBili  x   /  AST  22  /  ALT  34  /  AlkPhos  77  06-18    PT/INR - ( 18 Jun 2018 15:21 )   PT: 12.90 sec;   INR: 1.19 ratio         PTT - ( 18 Jun 2018 15:21 )  PTT:40.6 sec          CARDIAC MARKERS ( 19 Jun 2018 00:21 )  x     / <0.01 ng/mL / 85 U/L / x     / 2.0 ng/mL  CARDIAC MARKERS ( 18 Jun 2018 21:39 )  x     / <0.01 ng/mL / 101 U/L / x     / 2.0 ng/mL  CARDIAC MARKERS ( 18 Jun 2018 16:34 )  x     / <0.01 ng/mL / 101 U/L / x     / 1.6 ng/mL      RADIOLOGY:    PHYSICAL EXAM:  GEN: No acute distress  LUNGS: Clear to auscultation bilaterally   HEART: S1/S2 present. RRR.   ABD/ GI: Soft, non-tender, non-distended. Bowel sounds present  EXT: NC/NC/NE/2+PP/TERRELL  NEURO: AAOX3
SUBJECTIVE:    Patient is a 52y old Male who presents with a chief complaint of Chest pain and palpitations (18 Jun 2018 18:59)    Currently admitted to medicine with the primary diagnosis of SVT (supraventricular tachycardia)     Today is hospital day 3d. This morning he is resting comfortably in bed and reports no new issues or overnight events.     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     ALLERGIES:  No Known Allergies    MEDICATIONS:  STANDING MEDICATIONS  adenosine Injectable (ADENOSCAN) 60 milliGRAM(s) IV Bolus once  aspirin enteric coated 81 milliGRAM(s) Oral daily  benzocaine 15 mG/menthol 3.6 mG Lozenge 1 Lozenge Oral four times a day  enoxaparin Injectable 40 milliGRAM(s) SubCutaneous every 24 hours  verapamil  milliGRAM(s) Oral daily    PRN MEDICATIONS  acetaminophen   Tablet. 650 milliGRAM(s) Oral every 4 hours PRN  methocarbamol 750 milliGRAM(s) Oral three times a day PRN    VITALS:   T(F): 97.8  HR: 87  BP: 120/60  RR: 18  SpO2: 99%    LABS:                        15.7   10.38 )-----------( 409      ( 21 Jun 2018 08:12 )             44.4     06-21    141  |  102  |  18  ----------------------------<  96  4.4   |  25  |  1.0    Ca    9.5      21 Jun 2018 08:12  Mg     2.1     06-21                    RADIOLOGY:    PHYSICAL EXAM:  GEN: No acute distress  LUNGS: Clear to auscultation bilaterally   HEART: S1/S2 present. RRR.   ABD/ GI: Soft, non-tender, non-distended. Bowel sounds present  EXT: NC/NC/NE/2+PP/TERRELL  NEURO: AAOX3
SUBJECTIVE:    Patient is a 52y old Male who presents with a chief complaint of Chest pain and palpitations (18 Jun 2018 18:59)  Currently admitted to medicine with the primary diagnosis of SVT (supraventricular tachycardia)  Today is hospital day 2d. This morning he is resting comfortably in bed and reports no new issues or overnight events.     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 1000 milliLiter(s) IV Continuous <Continuous>  verapamil  milliGRAM(s) Oral daily    PRN MEDICATIONS  acetaminophen   Tablet. 650 milliGRAM(s) Oral every 4 hours PRN  methocarbamol 750 milliGRAM(s) Oral three times a day PRN    VITALS:   T(F): 96.3  HR: 89  BP: 137/83  RR: 20  SpO2: 100%    LABS:                        15.4   11.35 )-----------( 415      ( 19 Jun 2018 08:25 )             44.4     06-19    140  |  102  |  16  ----------------------------<  97  4.7   |  20  |  1.0    Ca    8.9      19 Jun 2018 08:25    TPro  7.2  /  Alb  4.2  /  TBili  0.5  /  DBili  x   /  AST  22  /  ALT  34  /  AlkPhos  77  06-18    PT/INR - ( 18 Jun 2018 15:21 )   PT: 12.90 sec;   INR: 1.19 ratio         PTT - ( 18 Jun 2018 15:21 )  PTT:40.6 sec          CARDIAC MARKERS ( 19 Jun 2018 00:21 )  x     / <0.01 ng/mL / 85 U/L / x     / 2.0 ng/mL  CARDIAC MARKERS ( 18 Jun 2018 21:39 )  x     / <0.01 ng/mL / 101 U/L / x     / 2.0 ng/mL  CARDIAC MARKERS ( 18 Jun 2018 16:34 )  x     / <0.01 ng/mL / 101 U/L / x     / 1.6 ng/mL      RADIOLOGY:  Transthoracic Echocardiogram   1. Normal left ventricular internal cavity size.   2. Spectral Doppler shows impaired relaxation pattern of left   ventricular myocardial filling (Grade I diastolic dysfunction).   3. Moderately enlarged left atrium.   4. Normal right atrial size.   5. Mild mitral annular calcification.   6. Thickening of the anterior and posterior mitral valve leaflets.   7. Trace tricuspid regurgitation.      PHYSICAL EXAM:  GEN: No acute distress  LUNGS: Clear to auscultation bilaterally   HEART: S1/S2 present. RRR.   ABD: Soft, non-tender, non-distended. Bowel sounds present  EXT: NC/NC/NE/2+PP/TERRELL  NEURO: AAOX3
SUBJECTIVE:    Patient is a 52y old Male who presents with a chief complaint of Chest pain and palpitations (18 Jun 2018 18:59)  Currently admitted to medicine with the primary diagnosis of SVT (supraventricular tachycardia)  Today is hospital day 3d. This morning he is resting comfortably in bed and reports no new issues or overnight events.     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 650 milliGRAM(s) Oral every 4 hours PRN  methocarbamol 750 milliGRAM(s) Oral three times a day PRN    VITALS:   T(F): 96.5  HR: 68  BP: 124/81  RR: 20  SpO2: 99%    LABS:                        15.7   10.38 )-----------( 409      ( 21 Jun 2018 08:12 )             44.4     06-21    141  |  102  |  18  ----------------------------<  96  4.4   |  25  |  1.0    Ca    9.5      21 Jun 2018 08:12  Mg     2.1     06-21      PHYSICAL EXAM:  GEN: No acute distress  LUNGS: Clear to auscultation bilaterally   HEART: S1/S2 present. RRR.   ABD: Soft, non-tender, non-distended. Bowel sounds present  EXT: NC/NC/NE/2+PP/TERRELL  NEURO: AAOX3
SUBJECTIVE:    Patient is a 52y old Male who presents with a chief complaint of Chest pain and palpitations (21 Jun 2018 16:27)    Currently admitted to medicine with the primary diagnosis of SVT (supraventricular tachycardia)     Today is hospital day 4d. This morning he is resting comfortably in bed and reports no new issues or overnight events.     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     ALLERGIES:  No Known Allergies    MEDICATIONS:  STANDING MEDICATIONS  adenosine Injectable (ADENOSCAN) 60 milliGRAM(s) IV Bolus once  aspirin enteric coated 81 milliGRAM(s) Oral daily  benzocaine 15 mG/menthol 3.6 mG Lozenge 1 Lozenge Oral four times a day  enoxaparin Injectable 40 milliGRAM(s) SubCutaneous every 24 hours  verapamil  milliGRAM(s) Oral daily    PRN MEDICATIONS  acetaminophen   Tablet. 650 milliGRAM(s) Oral every 4 hours PRN  methocarbamol 750 milliGRAM(s) Oral three times a day PRN    VITALS:   T(F): 97.3  HR: 72  BP: 105/63  RR: 18  SpO2: --    LABS:                        15.7   10.38 )-----------( 409      ( 21 Jun 2018 08:12 )             44.4     06-21    141  |  102  |  18  ----------------------------<  96  4.4   |  25  |  1.0    Ca    9.5      21 Jun 2018 08:12  Mg     2.1     06-21                    RADIOLOGY:    PHYSICAL EXAM:  GEN: No acute distress  LUNGS: Clear to auscultation bilaterally   HEART: S1/S2 present. RRR.   ABD/ GI: Soft, non-tender, non-distended. Bowel sounds present  EXT: NC/NC/NE/2+PP/TERRELL  NEURO: AAOX3

## 2018-06-26 DIAGNOSIS — E87.2 ACIDOSIS: ICD-10-CM

## 2018-06-26 DIAGNOSIS — M50.20 OTHER CERVICAL DISC DISPLACEMENT, UNSPECIFIED CERVICAL REGION: ICD-10-CM

## 2018-06-26 DIAGNOSIS — J02.9 ACUTE PHARYNGITIS, UNSPECIFIED: ICD-10-CM

## 2018-06-26 DIAGNOSIS — Z80.6 FAMILY HISTORY OF LEUKEMIA: ICD-10-CM

## 2018-06-26 DIAGNOSIS — F41.9 ANXIETY DISORDER, UNSPECIFIED: ICD-10-CM

## 2018-06-26 DIAGNOSIS — Z87.01 PERSONAL HISTORY OF PNEUMONIA (RECURRENT): ICD-10-CM

## 2018-06-26 DIAGNOSIS — I47.1 SUPRAVENTRICULAR TACHYCARDIA: ICD-10-CM

## 2018-06-26 DIAGNOSIS — Z82.49 FAMILY HISTORY OF ISCHEMIC HEART DISEASE AND OTHER DISEASES OF THE CIRCULATORY SYSTEM: ICD-10-CM

## 2018-08-13 ENCOUNTER — INPATIENT (INPATIENT)
Facility: HOSPITAL | Age: 53
LOS: 1 days | Discharge: HOME | End: 2018-08-15
Attending: INTERNAL MEDICINE | Admitting: INTERNAL MEDICINE

## 2018-08-13 VITALS
WEIGHT: 259.93 LBS | RESPIRATION RATE: 20 BRPM | DIASTOLIC BLOOD PRESSURE: 100 MMHG | HEIGHT: 72 IN | SYSTOLIC BLOOD PRESSURE: 174 MMHG | OXYGEN SATURATION: 97 % | TEMPERATURE: 97 F | HEART RATE: 91 BPM

## 2018-08-13 DIAGNOSIS — Z82.49 FAMILY HISTORY OF ISCHEMIC HEART DISEASE AND OTHER DISEASES OF THE CIRCULATORY SYSTEM: ICD-10-CM

## 2018-08-13 DIAGNOSIS — F41.9 ANXIETY DISORDER, UNSPECIFIED: ICD-10-CM

## 2018-08-13 DIAGNOSIS — I20.9 ANGINA PECTORIS, UNSPECIFIED: ICD-10-CM

## 2018-08-13 DIAGNOSIS — E66.9 OBESITY, UNSPECIFIED: ICD-10-CM

## 2018-08-13 DIAGNOSIS — I47.1 SUPRAVENTRICULAR TACHYCARDIA: ICD-10-CM

## 2018-08-13 DIAGNOSIS — I10 ESSENTIAL (PRIMARY) HYPERTENSION: ICD-10-CM

## 2018-08-13 DIAGNOSIS — Z79.82 LONG TERM (CURRENT) USE OF ASPIRIN: ICD-10-CM

## 2018-08-13 DIAGNOSIS — R07.9 CHEST PAIN, UNSPECIFIED: ICD-10-CM

## 2018-08-13 DIAGNOSIS — R73.03 PREDIABETES: ICD-10-CM

## 2018-08-13 DIAGNOSIS — J34.2 DEVIATED NASAL SEPTUM: Chronic | ICD-10-CM

## 2018-08-13 LAB
BASOPHILS # BLD AUTO: 0.09 K/UL — SIGNIFICANT CHANGE UP (ref 0–0.2)
BASOPHILS NFR BLD AUTO: 0.9 % — SIGNIFICANT CHANGE UP (ref 0–1)
EOSINOPHIL # BLD AUTO: 0.17 K/UL — SIGNIFICANT CHANGE UP (ref 0–0.7)
EOSINOPHIL NFR BLD AUTO: 1.7 % — SIGNIFICANT CHANGE UP (ref 0–8)
HCT VFR BLD CALC: 45.9 % — SIGNIFICANT CHANGE UP (ref 42–52)
HGB BLD-MCNC: 16.4 G/DL — SIGNIFICANT CHANGE UP (ref 14–18)
IMM GRANULOCYTES NFR BLD AUTO: 0.5 % — HIGH (ref 0.1–0.3)
LYMPHOCYTES # BLD AUTO: 3.37 K/UL — SIGNIFICANT CHANGE UP (ref 1.2–3.4)
LYMPHOCYTES # BLD AUTO: 33.2 % — SIGNIFICANT CHANGE UP (ref 20.5–51.1)
MCHC RBC-ENTMCNC: 30.7 PG — SIGNIFICANT CHANGE UP (ref 27–31)
MCHC RBC-ENTMCNC: 35.7 G/DL — SIGNIFICANT CHANGE UP (ref 32–37)
MCV RBC AUTO: 85.8 FL — SIGNIFICANT CHANGE UP (ref 80–94)
MONOCYTES # BLD AUTO: 0.79 K/UL — HIGH (ref 0.1–0.6)
MONOCYTES NFR BLD AUTO: 7.8 % — SIGNIFICANT CHANGE UP (ref 1.7–9.3)
NEUTROPHILS # BLD AUTO: 5.67 K/UL — SIGNIFICANT CHANGE UP (ref 1.4–6.5)
NEUTROPHILS NFR BLD AUTO: 55.9 % — SIGNIFICANT CHANGE UP (ref 42.2–75.2)
PLATELET # BLD AUTO: 366 K/UL — SIGNIFICANT CHANGE UP (ref 130–400)
RBC # BLD: 5.35 M/UL — SIGNIFICANT CHANGE UP (ref 4.7–6.1)
RBC # FLD: 12.5 % — SIGNIFICANT CHANGE UP (ref 11.5–14.5)
WBC # BLD: 10.14 K/UL — SIGNIFICANT CHANGE UP (ref 4.8–10.8)
WBC # FLD AUTO: 10.14 K/UL — SIGNIFICANT CHANGE UP (ref 4.8–10.8)

## 2018-08-13 RX ORDER — ASPIRIN/CALCIUM CARB/MAGNESIUM 324 MG
325 TABLET ORAL ONCE
Qty: 0 | Refills: 0 | Status: COMPLETED | OUTPATIENT
Start: 2018-08-13 | End: 2018-08-13

## 2018-08-13 NOTE — ED PROVIDER NOTE - INTERPRETATION
NSR at 96. Normal axis. Normal intervals. No acute ST-T changes. NSR at 96. Normal axis. Normal intervals. Inverted T waves in inferior leads, new from prior EKG.

## 2018-08-13 NOTE — ED PROVIDER NOTE - NS ED ROS FT
Constitutional: No fever, chills.  Eyes: No visual changes.  ENT: No hearing changes. No sore throat.  Neck: No neck pain or stiffness.  Cardiovascular: + chest pain. No palpitations, edema.  Pulmonary: No SOB, cough. No hemoptysis.  Abdominal: No abdominal pain, nausea, vomiting, diarrhea.  : No dysuria, frequency.  Neuro: No headache, syncope, dizziness.  MS: No back pain. No calf pain/swelling.  Psych: No suicidal ideations.

## 2018-08-13 NOTE — ED PROVIDER NOTE - MEDICAL DECISION MAKING DETAILS
pt aware of all labs and imaging, planned for ablation tomorrow, medical admitting team aware of pt, labs, imaging, admitted to tele as discussed with pt and admitting team.

## 2018-08-13 NOTE — ED PROVIDER NOTE - CARE PLAN
Assessment and plan of treatment:	PLan: Monitor, EKG, CXR, labs, repeat bp, ASA, reassess. Principal Discharge DX:	Chest pain  Assessment and plan of treatment:	PLan: Monitor, EKG, CXR, labs, repeat bp, ASA, reassess.

## 2018-08-13 NOTE — ED PROVIDER NOTE - OBJECTIVE STATEMENT
Pt is a 53 y/o male with hx of SVT with ablation planned tomorrow, presents to ED for burning to chest that started 2.5 hours ago, nonexertional, nonradiating. + associated SOB. No n/v, diaphoresis, leg pain/swelling. Pt states had normal stress test 1.5 months ago.

## 2018-08-13 NOTE — ED PROVIDER NOTE - PROGRESS NOTE DETAILS
pt reports no chest pain at this time, feeling better, aware of currents labs, scheduled for ablation tomorrow, ekg with new inversion, pt aware of admission and agrees.

## 2018-08-14 LAB
ABO RH CONFIRMATION: SIGNIFICANT CHANGE UP
ALBUMIN SERPL ELPH-MCNC: 4.5 G/DL — SIGNIFICANT CHANGE UP (ref 3.5–5.2)
ALP SERPL-CCNC: 88 U/L — SIGNIFICANT CHANGE UP (ref 30–115)
ALT FLD-CCNC: 61 U/L — HIGH (ref 0–41)
ANION GAP SERPL CALC-SCNC: 16 MMOL/L — HIGH (ref 7–14)
APTT BLD: 32.3 SEC — SIGNIFICANT CHANGE UP (ref 27–39.2)
AST SERPL-CCNC: 46 U/L — HIGH (ref 0–41)
BILIRUB SERPL-MCNC: 0.3 MG/DL — SIGNIFICANT CHANGE UP (ref 0.2–1.2)
BUN SERPL-MCNC: 15 MG/DL — SIGNIFICANT CHANGE UP (ref 10–20)
CALCIUM SERPL-MCNC: 9.8 MG/DL — SIGNIFICANT CHANGE UP (ref 8.5–10.1)
CHLORIDE SERPL-SCNC: 100 MMOL/L — SIGNIFICANT CHANGE UP (ref 98–110)
CO2 SERPL-SCNC: 22 MMOL/L — SIGNIFICANT CHANGE UP (ref 17–32)
CREAT SERPL-MCNC: 1 MG/DL — SIGNIFICANT CHANGE UP (ref 0.7–1.5)
GLUCOSE SERPL-MCNC: 104 MG/DL — HIGH (ref 70–99)
HDLC SERPL-MCNC: 42 MG/DL — SIGNIFICANT CHANGE UP (ref 40–125)
INR BLD: 1.11 RATIO — SIGNIFICANT CHANGE UP (ref 0.65–1.3)
MAGNESIUM SERPL-MCNC: 2.1 MG/DL — SIGNIFICANT CHANGE UP (ref 1.8–2.4)
POTASSIUM SERPL-MCNC: 5.1 MMOL/L — HIGH (ref 3.5–5)
POTASSIUM SERPL-SCNC: 5.1 MMOL/L — HIGH (ref 3.5–5)
PROT SERPL-MCNC: 8 G/DL — SIGNIFICANT CHANGE UP (ref 6–8)
PROTHROM AB SERPL-ACNC: 12 SEC — SIGNIFICANT CHANGE UP (ref 9.95–12.87)
SODIUM SERPL-SCNC: 138 MMOL/L — SIGNIFICANT CHANGE UP (ref 135–146)
TROPONIN T SERPL-MCNC: <0.01 NG/ML — SIGNIFICANT CHANGE UP

## 2018-08-14 RX ORDER — ONDANSETRON 8 MG/1
4 TABLET, FILM COATED ORAL ONCE
Qty: 0 | Refills: 0 | Status: DISCONTINUED | OUTPATIENT
Start: 2018-08-14 | End: 2018-08-15

## 2018-08-14 RX ORDER — NITROGLYCERIN 6.5 MG
0.4 CAPSULE, EXTENDED RELEASE ORAL
Qty: 0 | Refills: 0 | Status: DISCONTINUED | OUTPATIENT
Start: 2018-08-14 | End: 2018-08-15

## 2018-08-14 RX ORDER — VERAPAMIL HCL 240 MG
180 CAPSULE, EXTENDED RELEASE PELLETS 24 HR ORAL DAILY
Qty: 0 | Refills: 0 | Status: DISCONTINUED | OUTPATIENT
Start: 2018-08-14 | End: 2018-08-15

## 2018-08-14 RX ORDER — MORPHINE SULFATE 50 MG/1
2 CAPSULE, EXTENDED RELEASE ORAL
Qty: 0 | Refills: 0 | Status: DISCONTINUED | OUTPATIENT
Start: 2018-08-14 | End: 2018-08-15

## 2018-08-14 RX ORDER — ASPIRIN/CALCIUM CARB/MAGNESIUM 324 MG
81 TABLET ORAL DAILY
Qty: 0 | Refills: 0 | Status: DISCONTINUED | OUTPATIENT
Start: 2018-08-14 | End: 2018-08-15

## 2018-08-14 RX ORDER — METHOCARBAMOL 500 MG/1
750 TABLET, FILM COATED ORAL THREE TIMES A DAY
Qty: 0 | Refills: 0 | Status: DISCONTINUED | OUTPATIENT
Start: 2018-08-14 | End: 2018-08-15

## 2018-08-14 RX ORDER — SODIUM CHLORIDE 9 MG/ML
1000 INJECTION INTRAMUSCULAR; INTRAVENOUS; SUBCUTANEOUS
Qty: 0 | Refills: 0 | Status: DISCONTINUED | OUTPATIENT
Start: 2018-08-14 | End: 2018-08-14

## 2018-08-14 RX ORDER — ASPIRIN/CALCIUM CARB/MAGNESIUM 324 MG
325 TABLET ORAL ONCE
Qty: 0 | Refills: 0 | Status: COMPLETED | OUTPATIENT
Start: 2018-08-14 | End: 2018-08-14

## 2018-08-14 RX ADMIN — Medication 325 MILLIGRAM(S): at 03:37

## 2018-08-14 RX ADMIN — Medication 0.4 MILLIGRAM(S): at 03:00

## 2018-08-14 RX ADMIN — Medication 81 MILLIGRAM(S): at 10:11

## 2018-08-14 RX ADMIN — Medication 180 MILLIGRAM(S): at 05:53

## 2018-08-14 NOTE — CONSULT NOTE ADULT - ASSESSMENT
52 yrs old with Hx of SVT  presented for atypical chest discomfort  pt is scheduled for ablation Today  keep NPO  pt is on cardizem at home ( cw cardizem) 52 yrs old with Hx of SVT  presented for atypical chest discomfort previously admitted  in June with palpitations ,ekg showed SVT at rate of 180/MIN  pt is scheduled for ablation Today  keep NPO  pt is on cardizem at home (  cardizem)

## 2018-08-14 NOTE — H&P ADULT - NSHPREVIEWOFSYSTEMS_GEN_ALL_CORE
REVIEW OF SYSTEMS      General:	  ENMT:	  Respiratory and Thorax:  Cardiovascular:	  Gastrointestinal:	  Genitourinary:	  Musculoskeletal:	  Neurological:	  Psychiatric:	  Hematology/Lymphatics:	  Endocrine:	  Heme/immunologic:

## 2018-08-14 NOTE — ED ADULT NURSE NOTE - NSIMPLEMENTINTERV_GEN_ALL_ED
Implemented All Universal Safety Interventions:  [place] to call system. Call bell, personal items and telephone within reach. Instruct patient to call for assistance. Room bathroom lighting operational. Non-slip footwear when patient is off stretcher. Physically safe environment: no spills, clutter or unnecessary equipment. Stretcher in lowest position, wheels locked, appropriate side rails in place.

## 2018-08-14 NOTE — H&P ADULT - NSHPPHYSICALEXAM_GEN_ALL_CORE
PHYSICAL EXAM:    T(C): 36.4 (08-13-18 @ 23:08), Max: 36.4 (08-13-18 @ 23:08)  HR: 87 (08-13-18 @ 23:48) (87 - 91)  BP: 142/84 (08-13-18 @ 23:48) (142/84 - 174/100)  RR: 18 (08-13-18 @ 23:08) (18 - 20)  SpO2: 98% (08-13-18 @ 23:08) (97% - 98%)    Constitutional:  HEENT: Neck supple, No oral lesions, no throat redness or swelling  Respiratory: Breath sounds  CTA b/l, no accessory muscle use  Cardiovascular: regular rate and rhythm, normal S1 S2, no murmurs  Gastrointestinal: soft non-tender no hepatosplenomegaly, normal BS  Genitourinary: no lesions, no discharge  Extremities: positive peripheral pulses no exremity edema  Neurological: AAO4 no focal deficit  Skin: no lesions or wounds  Musculoskeletal: no joint swelling or tenderness PHYSICAL EXAM:    T(C): 36.4 (08-13-18 @ 23:08), Max: 36.4 (08-13-18 @ 23:08)  HR: 87 (08-13-18 @ 23:48) (87 - 91)  BP: 142/84 (08-13-18 @ 23:48) (142/84 - 174/100)  RR: 18 (08-13-18 @ 23:08) (18 - 20)  SpO2: 98% (08-13-18 @ 23:08) (97% - 98%)    Constitutional: anxious, no acute distress  HEENT: Neck supple, No oral lesions, no throat redness or swelling  Respiratory: Breath sounds  CTA b/l, no accessory muscle use  Cardiovascular: regular rate and rhythm, normal S1 S2, no murmurs  Gastrointestinal: soft non-tender no hepatosplenomegaly, normal BS  Genitourinary: no lesions, no discharge  Extremities: positive peripheral pulses no exremity edema  Neurological: AAO4 no focal motor deficit  Skin: no lesions or wounds  Musculoskeletal: no joint swelling or tenderness

## 2018-08-14 NOTE — H&P ADULT - NSHPLABSRESULTS_GEN_ALL_CORE
16.4   10.14 )-----------( 366      ( 13 Aug 2018 22:33 )             45.9       08-13    138  |  100  |  15  ----------------------------<  104<H>  5.1<H>   |  22  |  1.0    Ca    9.8      13 Aug 2018 22:33  Mg     2.1     08-13    TPro  8.0  /  Alb  4.5  /  TBili  0.3  /  DBili  x   /  AST  46<H>  /  ALT  61<H>  /  AlkPhos  88  08-13            < from: NM Nuclear Stress Pharmacologic Multiple (06.21.18 @ 13:08) >    1. NORMAL ADENOSINE / REST MYOCARDIAL PERFUSION TOMOGRAPHY, WITH NO   EVIDENCE FOR ISCHEMIA DURING ADENOSINE INFUSION.   2. NORMAL RESTING LEFT VENTRICULAR WALL MOTION AND WALL THICKENING.  3. LEFT VENTRICULAR EJECTION FRACTION OF  67 % WHICH IS WITHIN RANGE OF   NORMAL.     < end of copied text >    < from: Transthoracic Echocardiogram (06.19.18 @ 09:22) >     1. Normal left ventricular internal cavity size.   2. Spectral Doppler shows impaired relaxation pattern of left   ventricular myocardial filling (Grade I diastolic dysfunction).   3. Moderately enlarged left atrium.   4. Normal right atrial size.   5. Mild mitral annular calcification.   6. Thickening of the anterior and posterior mitral valve leaflets.   7. Trace tricuspid regurgitation.    < end of copied text >            PT/INR - ( 13 Aug 2018 22:33 )   PT: 12.00 sec;   INR: 1.11 ratio         PTT - ( 13 Aug 2018 22:33 )  PTT:32.3 sec    Lactate Trend      CARDIAC MARKERS ( 13 Aug 2018 22:33 )  x     / <0.01 ng/mL / x     / x     / x            CAPILLARY BLOOD GLUCOSE 16.4   10.14 )-----------( 366      ( 13 Aug 2018 22:33 )             45.9       08-13    138  |  100  |  15  ----------------------------<  104<H>  5.1<H>   |  22  |  1.0    Ca    9.8      13 Aug 2018 22:33  Mg     2.1     08-13    TPro  8.0  /  Alb  4.5  /  TBili  0.3  /  DBili  x   /  AST  46<H>  /  ALT  61<H>  /  AlkPhos  88  08-13            EKG: TWI in III aVf    < from: NM Nuclear Stress Pharmacologic Multiple (06.21.18 @ 13:08) >    1. NORMAL ADENOSINE / REST MYOCARDIAL PERFUSION TOMOGRAPHY, WITH NO   EVIDENCE FOR ISCHEMIA DURING ADENOSINE INFUSION.   2. NORMAL RESTING LEFT VENTRICULAR WALL MOTION AND WALL THICKENING.  3. LEFT VENTRICULAR EJECTION FRACTION OF  67 % WHICH IS WITHIN RANGE OF   NORMAL.     < end of copied text >    < from: Transthoracic Echocardiogram (06.19.18 @ 09:22) >     1. Normal left ventricular internal cavity size.   2. Spectral Doppler shows impaired relaxation pattern of left   ventricular myocardial filling (Grade I diastolic dysfunction).   3. Moderately enlarged left atrium.   4. Normal right atrial size.   5. Mild mitral annular calcification.   6. Thickening of the anterior and posterior mitral valve leaflets.   7. Trace tricuspid regurgitation.    < end of copied text >            PT/INR - ( 13 Aug 2018 22:33 )   PT: 12.00 sec;   INR: 1.11 ratio         PTT - ( 13 Aug 2018 22:33 )  PTT:32.3 sec    Lactate Trend      CARDIAC MARKERS ( 13 Aug 2018 22:33 )  x     / <0.01 ng/mL / x     / x     / x            CAPILLARY BLOOD GLUCOSE

## 2018-08-14 NOTE — H&P ADULT - HISTORY OF PRESENT ILLNESS
53 yo male with SVT presented for chest tightness of 1 day duration.   He reports having 6/10 chest tightness without pain, not associated with n/v/diaphoresis, has a fleeting feeling of palpitation. He presented 2 months ago to the hospital for a rapid heart rate and was found to have SVT which he follows with Dr Figueroa for. Ischemic workup back then included NM stress test (Adenosine) which was normal at rest and during adenosine infusion (no evidence of ischemia). He was started on verapamil and followed up with dr Figueroa., and scheduled for ablation procedure on 8/14, but patient developed the symptoms. He has extensive cardiac history from his mother's side (cardiac death in grandpa and uncles).    In ED ASA was not given due to patient's scheduled procedure in AM and atypical presentation. His tightness improved with s/l nitroglycerin.    Discussed case with MAR and cardiology fellow. 53 yo male with SVT presented for chest tightness of 1 day duration.   He reports having 6/10 chest tightness without pain, at rest, not associated with n/v/diaphoresis, has a fleeting feeling of palpitation. He presented 2 months ago to the hospital for a rapid heart rate and was found to have SVT which he follows with Dr Figueroa for. Ischemic workup back then included NM stress test (Adenosine) which was normal at rest and during adenosine infusion (no evidence of ischemia). He was started on verapamil and followed up with dr Figueroa., and scheduled for ablation procedure on 8/14, but patient developed the symptoms. He has extensive cardiac history from his mother's side (cardiac death in grandpa and uncles).    In ED ASA was not given due to patient's scheduled procedure in AM and atypical presentation. His tightness improved with s/l nitroglycerin.    Discussed case with MAR and cardiology fellow.

## 2018-08-14 NOTE — H&P ADULT - ASSESSMENT
53 yo male with SVT presented with chest tightness..    # Chest tightness/ Unstable angina:  - EKG shows new TWI  - admit to telemetry  - CE x1 neg, f/u repeat CE in AM  - cardiology c/s Dr Siegel  - JOHN, d/w fellow, no plavix or therapeutic anticoagulation  - s/l nitroglycerin  - atorvastatin 80 mg PO OD  - TTE in AM  - consider CCT/ cardiac catheterization    # SVT:  - c/w verapamil  - c/s Dr Figueroa for ablation    # DVT ppx:  - heparin sq  Full code  DASH TLC diet

## 2018-08-14 NOTE — CONSULT NOTE ADULT - SUBJECTIVE AND OBJECTIVE BOX
HPI:  51 yo male with SVT presented for chest tightness of 1 day duration.   . He presented 2 months ago to the hospital for a rapid heart rate and was found to have SVT which he follows with Dr Figueroa for. Ischemic workup back then included NM stress test (Adenosine) which was normal  He was started on verapamil then switced to cardizem as outpt due to and followed up with dr Figueroa., pt is scheduled for ablation procedure today.      PAST MEDICAL & SURGICAL HISTORY  SVT (supraventricular tachycardia)  Prediabetes  Palpitations  Anxiety  Nasal septal deviation: sp nasal septum repair      FAMILY HISTORY:  FAMILY HISTORY:  Family history of early CAD (Aunt)  Family history of leukemia (Father): father  Family history of chronic obstructive lung disease (Mother): mother      SOCIAL HISTORY:  []smoker  []Alcohol  []Drug    ALLERGIES:  No Known Allergies      MEDICATIONS:  MEDICATIONS  (STANDING):  aspirin  chewable 81 milliGRAM(s) Oral daily  verapamil  milliGRAM(s) Oral daily    MEDICATIONS  (PRN):  methocarbamol 750 milliGRAM(s) Oral three times a day PRN muscle spasm  nitroglycerin     SubLingual 0.4 milliGRAM(s) SubLingual every 5 minutes PRN Chest Pain      HOME MEDICATIONS:  Home Medications:  Robaxin-750 oral tablet: 1 tab(s) orally 3 times a day, As Needed (18 Jun 2018 19:32)  cardizem 180 q 24h      VITALS:   T(F): 97.2 (08-14 @ 06:00), Max: 97.6 (08-13 @ 23:08)  HR: 79 (08-14 @ 06:00) (79 - 91)  BP: 127/82 (08-14 @ 06:00) (127/82 - 174/100)  BP(mean): --  RR: 18 (08-14 @ 06:00) (18 - 20)  SpO2: 99% (08-14 @ 06:00) (97% - 99%)    I&O's Summary      REVIEW OF SYSTEMS:  CONSTITUTIONAL: No weakness, fevers or chills  EYES/ENT: No visual changes;  No vertigo or throat pain   NECK: No pain or stiffness  RESPIRATORY: No cough, wheezing, hemoptysis; No shortness of breath  CARDIOVASCULAR: No chest pain or palpitations  GASTROINTESTINAL: No abdominal or epigastric pain. No nausea, vomiting, or hematemesis; No diarrhea or constipation. No melena or hematochezia.  GENITOURINARY: No dysuria, frequency or hematuria  NEUROLOGICAL: No numbness or weakness  SKIN: No itching, no rashes    PHYSICAL EXAM:  NEURO: patient is awake , alert and oriented  GEN: Not in acute distress  NECK: no thyroid enlargement, no JVD  LUNGS: Clear to auscultation bilaterally   CARDIOVASCULAR: S1/S2 present, RRR , no murmus or rubs, + PP bilaterally  ABD: Soft, non-tender, non-distended, +BS  EXT: No KENNEDY  SKIN: Intact    LABS:                        16.4   10.14 )-----------( 366      ( 13 Aug 2018 22:33 )             45.9     08-13    138  |  100  |  15  ----------------------------<  104<H>  5.1<H>   |  22  |  1.0    Ca    9.8      13 Aug 2018 22:33  Mg     2.1     08-13    TPro  8.0  /  Alb  4.5  /  TBili  0.3  /  DBili  x   /  AST  46<H>  /  ALT  61<H>  /  AlkPhos  88  08-13    PT/INR - ( 13 Aug 2018 22:33 )   PT: 12.00 sec;   INR: 1.11 ratio         PTT - ( 13 Aug 2018 22:33 )  PTT:32.3 sec  Troponin T, Serum: <0.01 ng/mL (08-13-18 @ 22:33)    CARDIAC MARKERS ( 13 Aug 2018 22:33 )  x     / <0.01 ng/mL / x     / x     / x            Troponin trend:      08-13 Chol -- LDL -- HDL 42 Trig --, 06-18 Chol 215<H> <H> HDL 35<L> Trig 73  Hemoglobin A1C   Thyroid      RADIOLOGY:  -CXR:< from: Xray Chest 2 Views PA/Lat (08.14.18 @ 00:21) >  Impression:      No radiographic evidence of acute cardiopulmonary disease.    < end of copied text >    -TTE:< from: Transthoracic Echocardiogram (06.19.18 @ 09:22) >    Summary:   1. Normal left ventricular internal cavity size.   2. Spectral Doppler shows impaired relaxation pattern of left   ventricular myocardial filling (Grade I diastolic dysfunction).   3. Moderately enlarged left atrium.   4. Normal right atrial size.   5. Mild mitral annular calcification.   6. Thickening of the anterior and posterior mitral valve leaflets.   7. Trace tricuspid regurgitation.    < end of copied text >      -STRESS TEST:< from: NM Nuclear Stress Pharmacologic Multiple (06.21.18 @ 13:08) >  Impression:   1. NORMAL ADENOSINE / REST MYOCARDIAL PERFUSION TOMOGRAPHY, WITH NO   EVIDENCE FOR ISCHEMIA DURING ADENOSINE INFUSION.   2. NORMAL RESTING LEFT VENTRICULAR WALL MOTION AND WALL THICKENING.  3. LEFT VENTRICULAR EJECTION FRACTION OF  67 % WHICH IS WITHIN RANGE OF   NORMAL.     < end of copied text >      ECG:  Sinus rythm

## 2018-08-14 NOTE — ED ADULT NURSE NOTE - OBJECTIVE STATEMENT
Non-radiating CP burning in sensation x 2.5 h but pt states "the sensation is kind of gone now," scheduled ablation tomorrow for SVT, endorses intermittent SOB

## 2018-08-14 NOTE — CONSULT NOTE ADULT - CONSULT REASON
Hx of SVT Hx of SVT prvios admission with palpitation . 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

## 2018-08-15 ENCOUNTER — TRANSCRIPTION ENCOUNTER (OUTPATIENT)
Age: 53
End: 2018-08-15

## 2018-08-15 VITALS
RESPIRATION RATE: 17 BRPM | SYSTOLIC BLOOD PRESSURE: 138 MMHG | DIASTOLIC BLOOD PRESSURE: 78 MMHG | TEMPERATURE: 98 F | HEART RATE: 92 BPM

## 2018-08-15 RX ORDER — METOPROLOL TARTRATE 50 MG
1 TABLET ORAL
Qty: 30 | Refills: 0 | OUTPATIENT
Start: 2018-08-15

## 2018-08-15 RX ADMIN — Medication 81 MILLIGRAM(S): at 11:46

## 2018-08-15 NOTE — DISCHARGE NOTE ADULT - MEDICATION SUMMARY - MEDICATIONS TO TAKE
I will START or STAY ON the medications listed below when I get home from the hospital:    aspirin 81 mg oral delayed release tablet  -- 1 tab(s) by mouth once a day  -- Indication: For CAD    metoprolol succinate 25 mg oral tablet, extended release  -- 1 tab(s) by mouth once a day   -- It is very important that you take or use this exactly as directed.  Do not skip doses or discontinue unless directed by your doctor.  May cause drowsiness.  Alcohol may intensify this effect.  Use care when operating dangerous machinery.  Some non-prescription drugs may aggravate your condition.  Read all labels carefully.  If a warning appears, check with your doctor before taking.  Swallow whole.  Do not crush.  Take with food or milk.  This drug may impair the ability to drive or operate machinery.  Use care until you become familiar with its effects.    -- Indication: For SVT (supraventricular tachycardia)    Robaxin-750 oral tablet  -- 1 tab(s) by mouth 3 times a day, As Needed  -- Indication: For Muscle relaxant
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
## 2018-08-15 NOTE — DISCHARGE NOTE ADULT - CARE PROVIDER_API CALL
Janak Sneed), Medicine  [address] [place] [address]  Phone: [phone]  Fax: [phone]    Melvin Diaz), Cardiology; Internal Medicine  [address] [place] [address]  Phone: [phone]  Fax: [phone]    Brandon Siegel), Cardiovascular Disease; Interventional Cardiology  [address] [place] [address]  Phone: [phone]  Fax: [phone]

## 2018-08-15 NOTE — PROGRESS NOTE ADULT - SUBJECTIVE AND OBJECTIVE BOX
no chest pain and no sob     Vital Signs Last 24 Hrs  T(C): 35.8 (15 Aug 2018 06:17), Max: 35.8 (14 Aug 2018 20:00)  T(F): 96.5 (15 Aug 2018 06:17), Max: 96.5 (15 Aug 2018 06:17)  HR: 83 (15 Aug 2018 06:17) (83 - 86)  BP: 133/74 (15 Aug 2018 06:17) (133/74 - 136/79)  BP(mean): --  RR: 18 (15 Aug 2018 06:17) (18 - 18)  SpO2: --    PHYSICAL EXAM:  GENERAL: NAD, well-developed  HEAD:  Atraumatic, Normocephalic  EYES: EOMI, PERRLA, conjunctiva and sclera clear  NECK: Supple, No JVD  Pulm Clear to auscultation bilaterally; No wheeze  CV: Regular rate and rhythm; No murmurs, rubs, or gallops  GI: Soft, Nontender, Nondistended; Bowel sounds present  EXTREMITIES:  2+ Peripheral Pulses, No clubbing, cyanosis, or edema  PSYCH: AAOx3  NEUROLOGY: non-focal  SKIN: No rashes or lesions                          16.4   10.14 )-----------( 366      ( 13 Aug 2018 22:33 )             45.9     08-13    138  |  100  |  15  ----------------------------<  104<H>  5.1<H>   |  22  |  1.0    Ca    9.8      13 Aug 2018 22:33  Mg     2.1     08-13    TPro  8.0  /  Alb  4.5  /  TBili  0.3  /  DBili  x   /  AST  46<H>  /  ALT  61<H>  /  AlkPhos  88  08-13    LIVER FUNCTIONS - ( 13 Aug 2018 22:33 )  Alb: 4.5 g/dL / Pro: 8.0 g/dL / ALK PHOS: 88 U/L / ALT: 61 U/L / AST: 46 U/L / GGT: x           PT/INR - ( 13 Aug 2018 22:33 )   PT: 12.00 sec;   INR: 1.11 ratio         PTT - ( 13 Aug 2018 22:33 )  PTT:32.3 sec  CARDIAC MARKERS ( 13 Aug 2018 22:33 )  x     / <0.01 ng/mL / x     / x     / x

## 2018-08-15 NOTE — DISCHARGE NOTE ADULT - MEDICATION SUMMARY - MEDICATIONS TO STOP TAKING
I will STOP taking the medications listed below when I get home from the hospital:    verapamil 180 mg/12 hours oral tablet, extended release  -- 1 tab(s) by mouth once a day

## 2018-08-15 NOTE — PROGRESS NOTE ADULT - ASSESSMENT
POST ABLATION FOR SVT  NO COMPLICATIONS  DISCHARGE HOME ON METOPROLOL SUCC, 25 MG/DAY  WORK UP FOR ARIEL AS OUT PATIENT

## 2018-08-15 NOTE — DISCHARGE NOTE ADULT - PATIENT PORTAL LINK FT
You can access the [facility] Patient Portal, offered by [facility], by registering with the following website: [email or web address]

## 2018-08-15 NOTE — DISCHARGE NOTE ADULT - HOSPITAL COURSE
51 yo male with PMHx: Anxiety, palpitations, SVT presented with: chest tightness of 1 day duration. He underwent ablation for the SVT without complications. He is being discharged in stable condition with instructions to follow up with his PMD, Cardiologist and Electrophysiologist in 1-2 weeks after discharge. Please take Metoprolol succinate 25 mg QD and ARIEL work up outpatient.

## 2018-08-15 NOTE — CONSULT NOTE ADULT - ASSESSMENT
#Chest tightness   -s/p AV nghia ablation  - likely 2/2 to SVT           -resolution of symptoms with ablation  -No DM, HTN, tobacco hx      -Continue medical management  -Follow up as outpatient

## 2018-08-15 NOTE — DISCHARGE NOTE ADULT - CARE PLAN
Principal Discharge DX:	SVT (supraventricular tachycardia)  Goal:	Rate control  Assessment and plan of treatment:	Take medications as prescribed. Follow up with PMD, Cardiologist, Electrophysiologist in 1-2 weeks after discharge.  Secondary Diagnosis:	Chest pain, unspecified type  Goal:	Symptom resolution  Assessment and plan of treatment:	Take medications as prescribed. Follow up with PMD, Cardiologist, Electrophysiologist in 1-2 weeks after discharge.

## 2018-08-15 NOTE — PROGRESS NOTE ADULT - ASSESSMENT
SVT s/p ablation:   cleared by EP for discharge   D/C CCB and start toprol 25 qday     discharge today with OPT follow up     spent 35min on  discharge

## 2018-08-15 NOTE — DISCHARGE NOTE ADULT - PLAN OF CARE
Rate control Take medications as prescribed. Follow up with PMD, Cardiologist, Electrophysiologist in 1-2 weeks after discharge. Symptom resolution

## 2018-10-22 PROBLEM — I47.1 SUPRAVENTRICULAR TACHYCARDIA: Chronic | Status: ACTIVE | Noted: 2018-08-14

## 2018-10-25 ENCOUNTER — OUTPATIENT (OUTPATIENT)
Dept: OUTPATIENT SERVICES | Facility: HOSPITAL | Age: 53
LOS: 1 days | Discharge: HOME | End: 2018-10-25

## 2018-10-25 VITALS
WEIGHT: 273.37 LBS | RESPIRATION RATE: 16 BRPM | TEMPERATURE: 98 F | HEART RATE: 76 BPM | DIASTOLIC BLOOD PRESSURE: 96 MMHG | OXYGEN SATURATION: 98 % | SYSTOLIC BLOOD PRESSURE: 130 MMHG | HEIGHT: 72 IN

## 2018-10-25 DIAGNOSIS — Z01.818 ENCOUNTER FOR OTHER PREPROCEDURAL EXAMINATION: ICD-10-CM

## 2018-10-25 DIAGNOSIS — G56.03 CARPAL TUNNEL SYNDROME, BILATERAL UPPER LIMBS: ICD-10-CM

## 2018-10-25 DIAGNOSIS — J34.2 DEVIATED NASAL SEPTUM: Chronic | ICD-10-CM

## 2018-10-25 LAB
ALBUMIN SERPL ELPH-MCNC: 4.4 G/DL — SIGNIFICANT CHANGE UP (ref 3.5–5.2)
ALP SERPL-CCNC: 109 U/L — SIGNIFICANT CHANGE UP (ref 30–115)
ALT FLD-CCNC: 66 U/L — HIGH (ref 0–41)
ANION GAP SERPL CALC-SCNC: 14 MMOL/L — SIGNIFICANT CHANGE UP (ref 7–14)
APTT BLD: 35.1 SEC — SIGNIFICANT CHANGE UP (ref 27–39.2)
AST SERPL-CCNC: 32 U/L — SIGNIFICANT CHANGE UP (ref 0–41)
BASOPHILS # BLD AUTO: 0.06 K/UL — SIGNIFICANT CHANGE UP (ref 0–0.2)
BASOPHILS NFR BLD AUTO: 0.8 % — SIGNIFICANT CHANGE UP (ref 0–1)
BILIRUB SERPL-MCNC: 0.3 MG/DL — SIGNIFICANT CHANGE UP (ref 0.2–1.2)
BUN SERPL-MCNC: 16 MG/DL — SIGNIFICANT CHANGE UP (ref 10–20)
CALCIUM SERPL-MCNC: 9.4 MG/DL — SIGNIFICANT CHANGE UP (ref 8.5–10.1)
CHLORIDE SERPL-SCNC: 96 MMOL/L — LOW (ref 98–110)
CO2 SERPL-SCNC: 24 MMOL/L — SIGNIFICANT CHANGE UP (ref 17–32)
CREAT SERPL-MCNC: 0.9 MG/DL — SIGNIFICANT CHANGE UP (ref 0.7–1.5)
EOSINOPHIL # BLD AUTO: 0.09 K/UL — SIGNIFICANT CHANGE UP (ref 0–0.7)
EOSINOPHIL NFR BLD AUTO: 1.2 % — SIGNIFICANT CHANGE UP (ref 0–8)
GLUCOSE SERPL-MCNC: 300 MG/DL — HIGH (ref 70–99)
HCT VFR BLD CALC: 44.5 % — SIGNIFICANT CHANGE UP (ref 42–52)
HGB BLD-MCNC: 15.7 G/DL — SIGNIFICANT CHANGE UP (ref 14–18)
IMM GRANULOCYTES NFR BLD AUTO: 0.4 % — HIGH (ref 0.1–0.3)
INR BLD: 1.02 RATIO — SIGNIFICANT CHANGE UP (ref 0.65–1.3)
LYMPHOCYTES # BLD AUTO: 2.22 K/UL — SIGNIFICANT CHANGE UP (ref 1.2–3.4)
LYMPHOCYTES # BLD AUTO: 29.8 % — SIGNIFICANT CHANGE UP (ref 20.5–51.1)
MCHC RBC-ENTMCNC: 30.3 PG — SIGNIFICANT CHANGE UP (ref 27–31)
MCHC RBC-ENTMCNC: 35.3 G/DL — SIGNIFICANT CHANGE UP (ref 32–37)
MCV RBC AUTO: 85.7 FL — SIGNIFICANT CHANGE UP (ref 80–94)
MONOCYTES # BLD AUTO: 0.48 K/UL — SIGNIFICANT CHANGE UP (ref 0.1–0.6)
MONOCYTES NFR BLD AUTO: 6.4 % — SIGNIFICANT CHANGE UP (ref 1.7–9.3)
NEUTROPHILS # BLD AUTO: 4.57 K/UL — SIGNIFICANT CHANGE UP (ref 1.4–6.5)
NEUTROPHILS NFR BLD AUTO: 61.4 % — SIGNIFICANT CHANGE UP (ref 42.2–75.2)
NRBC # BLD: 0 /100 WBCS — SIGNIFICANT CHANGE UP (ref 0–0)
PLATELET # BLD AUTO: 385 K/UL — SIGNIFICANT CHANGE UP (ref 130–400)
POTASSIUM SERPL-MCNC: 4.8 MMOL/L — SIGNIFICANT CHANGE UP (ref 3.5–5)
POTASSIUM SERPL-SCNC: 4.8 MMOL/L — SIGNIFICANT CHANGE UP (ref 3.5–5)
PROT SERPL-MCNC: 7.8 G/DL — SIGNIFICANT CHANGE UP (ref 6–8)
PROTHROM AB SERPL-ACNC: 11.7 SEC — SIGNIFICANT CHANGE UP (ref 9.95–12.87)
RBC # BLD: 5.19 M/UL — SIGNIFICANT CHANGE UP (ref 4.7–6.1)
RBC # FLD: 12 % — SIGNIFICANT CHANGE UP (ref 11.5–14.5)
SODIUM SERPL-SCNC: 134 MMOL/L — LOW (ref 135–146)
WBC # BLD: 7.45 K/UL — SIGNIFICANT CHANGE UP (ref 4.8–10.8)
WBC # FLD AUTO: 7.45 K/UL — SIGNIFICANT CHANGE UP (ref 4.8–10.8)

## 2018-10-25 NOTE — H&P PST ADULT - PMH
Anxiety    Back pain    HTN (hypertension)    Neck pain    Palpitations    Prediabetes    SVT (supraventricular tachycardia) Anxiety    Back pain    Class 2 obesity with body mass index (BMI) of 37.0 to 37.9 in adult, unspecified obesity type, unspecified whether serious comorbidity present    HTN (hypertension)    Neck pain    Palpitations    Prediabetes    SVT (supraventricular tachycardia)

## 2018-10-25 NOTE — H&P PST ADULT - HISTORY OF PRESENT ILLNESS
54 Y/O MALE PRESENTS TO PAST WITH HX LEFT CTS  PT NOW FOR SCHEDULED PROCEDURE. PT DENIES ANY CP SOB PALP COUGH DYSURIA FEVER URI. PT ABLE TO RAJ 1-2 FOS W/O SOB

## 2018-11-07 NOTE — ASU PATIENT PROFILE, ADULT - PMH
Anxiety    Back pain    Class 2 obesity with body mass index (BMI) of 37.0 to 37.9 in adult, unspecified obesity type, unspecified whether serious comorbidity present    HTN (hypertension)    Neck pain    Palpitations    Prediabetes    SVT (supraventricular tachycardia)

## 2018-11-08 ENCOUNTER — OUTPATIENT (OUTPATIENT)
Dept: OUTPATIENT SERVICES | Facility: HOSPITAL | Age: 53
LOS: 1 days | Discharge: HOME | End: 2018-11-08

## 2018-11-08 VITALS
TEMPERATURE: 98 F | SYSTOLIC BLOOD PRESSURE: 143 MMHG | DIASTOLIC BLOOD PRESSURE: 78 MMHG | HEIGHT: 72 IN | WEIGHT: 273.37 LBS | HEART RATE: 74 BPM | OXYGEN SATURATION: 98 % | RESPIRATION RATE: 18 BRPM

## 2018-11-08 VITALS
OXYGEN SATURATION: 96 % | HEART RATE: 100 BPM | DIASTOLIC BLOOD PRESSURE: 70 MMHG | TEMPERATURE: 98 F | SYSTOLIC BLOOD PRESSURE: 120 MMHG | RESPIRATION RATE: 20 BRPM

## 2018-11-08 DIAGNOSIS — J34.2 DEVIATED NASAL SEPTUM: Chronic | ICD-10-CM

## 2018-11-08 LAB — GLUCOSE BLDC GLUCOMTR-MCNC: 120 MG/DL — HIGH (ref 70–99)

## 2018-11-08 RX ORDER — SODIUM CHLORIDE 9 MG/ML
1000 INJECTION, SOLUTION INTRAVENOUS
Qty: 0 | Refills: 0 | Status: DISCONTINUED | OUTPATIENT
Start: 2018-11-08 | End: 2018-11-23

## 2018-11-08 RX ORDER — OXYCODONE AND ACETAMINOPHEN 5; 325 MG/1; MG/1
2 TABLET ORAL EVERY 6 HOURS
Qty: 0 | Refills: 0 | Status: DISCONTINUED | OUTPATIENT
Start: 2018-11-08 | End: 2018-11-08

## 2018-11-08 RX ORDER — MORPHINE SULFATE 50 MG/1
2 CAPSULE, EXTENDED RELEASE ORAL
Qty: 0 | Refills: 0 | Status: DISCONTINUED | OUTPATIENT
Start: 2018-11-08 | End: 2018-11-08

## 2018-11-08 RX ORDER — ONDANSETRON 8 MG/1
4 TABLET, FILM COATED ORAL ONCE
Qty: 0 | Refills: 0 | Status: DISCONTINUED | OUTPATIENT
Start: 2018-11-08 | End: 2018-11-23

## 2018-11-08 RX ADMIN — SODIUM CHLORIDE 100 MILLILITER(S): 9 INJECTION, SOLUTION INTRAVENOUS at 14:24

## 2018-11-08 NOTE — CHART NOTE - NSCHARTNOTEFT_GEN_A_CORE
PACU ANESTHESIA ADMISSION NOTE      Procedure: Endoscopic carpal tunnel release of left wrist    Post op diagnosis:  Carpal tunnel syndrome of left wrist      ____  Intubated  TV:______       Rate: ______      FiO2: ______    __x__  Patent Airway    __x__  Full return of protective reflexes    __x__  Full recovery from anesthesia / back to baseline status      Vitals:   /65           HR      108     RR    12         O2 sat  96  %      Temp 97.9 f      Mental Status:  __x__ Awake   _____ Alert   _____ Drowsy   _____ Sedated    Nausea/Vomiting:  __x__ No    ____ Yes, See Post - Op Orders        Pain Scale (0-10):  __0___    Treatment: ____ None    ____ See Post - Op/PCA Orders    Post - Operative Fluids:   __x__ Oral   ____ See Post - Op Orders    Plan: Discharge:   _x___Home       _____Floor     _____Critical Care    _____  Other:_________________    Comments: No anesthesia complications noted.  Discharge once criteria met.

## 2018-11-08 NOTE — BRIEF OPERATIVE NOTE - PROCEDURE
<<-----Click on this checkbox to enter Procedure Endoscopic carpal tunnel release of left wrist  11/08/2018    Active  [email or web address]

## 2018-11-13 DIAGNOSIS — F41.9 ANXIETY DISORDER, UNSPECIFIED: ICD-10-CM

## 2018-11-13 DIAGNOSIS — I10 ESSENTIAL (PRIMARY) HYPERTENSION: ICD-10-CM

## 2018-11-13 DIAGNOSIS — G56.02 CARPAL TUNNEL SYNDROME, LEFT UPPER LIMB: ICD-10-CM

## 2018-11-13 DIAGNOSIS — E66.9 OBESITY, UNSPECIFIED: ICD-10-CM

## 2018-11-19 PROBLEM — E66.9 OBESITY, UNSPECIFIED: Chronic | Status: ACTIVE | Noted: 2018-10-25

## 2018-11-19 PROBLEM — M54.2 CERVICALGIA: Chronic | Status: ACTIVE | Noted: 2018-10-25

## 2018-11-19 PROBLEM — I10 ESSENTIAL (PRIMARY) HYPERTENSION: Chronic | Status: ACTIVE | Noted: 2018-10-25

## 2018-11-19 PROBLEM — M54.9 DORSALGIA, UNSPECIFIED: Chronic | Status: ACTIVE | Noted: 2018-10-25

## 2019-01-10 ENCOUNTER — OUTPATIENT (OUTPATIENT)
Dept: OUTPATIENT SERVICES | Facility: HOSPITAL | Age: 54
LOS: 1 days | Discharge: HOME | End: 2019-01-10

## 2019-01-10 VITALS
DIASTOLIC BLOOD PRESSURE: 70 MMHG | RESPIRATION RATE: 16 BRPM | TEMPERATURE: 98 F | WEIGHT: 259.93 LBS | SYSTOLIC BLOOD PRESSURE: 122 MMHG | OXYGEN SATURATION: 98 % | HEART RATE: 16 BPM | HEIGHT: 72 IN

## 2019-01-10 DIAGNOSIS — G56.03 CARPAL TUNNEL SYNDROME, BILATERAL UPPER LIMBS: ICD-10-CM

## 2019-01-10 DIAGNOSIS — J34.2 DEVIATED NASAL SEPTUM: Chronic | ICD-10-CM

## 2019-01-10 DIAGNOSIS — Z01.818 ENCOUNTER FOR OTHER PREPROCEDURAL EXAMINATION: ICD-10-CM

## 2019-01-10 DIAGNOSIS — Z98.890 OTHER SPECIFIED POSTPROCEDURAL STATES: Chronic | ICD-10-CM

## 2019-01-10 LAB
ALBUMIN SERPL ELPH-MCNC: 4.7 G/DL — SIGNIFICANT CHANGE UP (ref 3.5–5.2)
ALP SERPL-CCNC: 102 U/L — SIGNIFICANT CHANGE UP (ref 30–115)
ALT FLD-CCNC: 71 U/L — HIGH (ref 0–41)
ANION GAP SERPL CALC-SCNC: 19 MMOL/L — HIGH (ref 7–14)
APTT BLD: 36.9 SEC — SIGNIFICANT CHANGE UP (ref 27–39.2)
AST SERPL-CCNC: 41 U/L — SIGNIFICANT CHANGE UP (ref 0–41)
BASOPHILS # BLD AUTO: 0.07 K/UL — SIGNIFICANT CHANGE UP (ref 0–0.2)
BASOPHILS NFR BLD AUTO: 0.7 % — SIGNIFICANT CHANGE UP (ref 0–1)
BILIRUB SERPL-MCNC: 0.2 MG/DL — SIGNIFICANT CHANGE UP (ref 0.2–1.2)
BUN SERPL-MCNC: 16 MG/DL — SIGNIFICANT CHANGE UP (ref 10–20)
CALCIUM SERPL-MCNC: 9.8 MG/DL — SIGNIFICANT CHANGE UP (ref 8.5–10.1)
CHLORIDE SERPL-SCNC: 96 MMOL/L — LOW (ref 98–110)
CO2 SERPL-SCNC: 23 MMOL/L — SIGNIFICANT CHANGE UP (ref 17–32)
CREAT SERPL-MCNC: 1 MG/DL — SIGNIFICANT CHANGE UP (ref 0.7–1.5)
EOSINOPHIL # BLD AUTO: 0.13 K/UL — SIGNIFICANT CHANGE UP (ref 0–0.7)
EOSINOPHIL NFR BLD AUTO: 1.4 % — SIGNIFICANT CHANGE UP (ref 0–8)
GLUCOSE SERPL-MCNC: 120 MG/DL — HIGH (ref 70–99)
HCT VFR BLD CALC: 46.4 % — SIGNIFICANT CHANGE UP (ref 42–52)
HGB BLD-MCNC: 16.1 G/DL — SIGNIFICANT CHANGE UP (ref 14–18)
IMM GRANULOCYTES NFR BLD AUTO: 0.5 % — HIGH (ref 0.1–0.3)
INR BLD: 1.04 RATIO — SIGNIFICANT CHANGE UP (ref 0.65–1.3)
LYMPHOCYTES # BLD AUTO: 2.71 K/UL — SIGNIFICANT CHANGE UP (ref 1.2–3.4)
LYMPHOCYTES # BLD AUTO: 28.3 % — SIGNIFICANT CHANGE UP (ref 20.5–51.1)
MCHC RBC-ENTMCNC: 30.6 PG — SIGNIFICANT CHANGE UP (ref 27–31)
MCHC RBC-ENTMCNC: 34.7 G/DL — SIGNIFICANT CHANGE UP (ref 32–37)
MCV RBC AUTO: 88 FL — SIGNIFICANT CHANGE UP (ref 80–94)
MONOCYTES # BLD AUTO: 0.8 K/UL — HIGH (ref 0.1–0.6)
MONOCYTES NFR BLD AUTO: 8.3 % — SIGNIFICANT CHANGE UP (ref 1.7–9.3)
NEUTROPHILS # BLD AUTO: 5.83 K/UL — SIGNIFICANT CHANGE UP (ref 1.4–6.5)
NEUTROPHILS NFR BLD AUTO: 60.8 % — SIGNIFICANT CHANGE UP (ref 42.2–75.2)
NRBC # BLD: 0 /100 WBCS — SIGNIFICANT CHANGE UP (ref 0–0)
PLATELET # BLD AUTO: 425 K/UL — HIGH (ref 130–400)
POTASSIUM SERPL-MCNC: 4.7 MMOL/L — SIGNIFICANT CHANGE UP (ref 3.5–5)
POTASSIUM SERPL-SCNC: 4.7 MMOL/L — SIGNIFICANT CHANGE UP (ref 3.5–5)
PROT SERPL-MCNC: 7.9 G/DL — SIGNIFICANT CHANGE UP (ref 6–8)
PROTHROM AB SERPL-ACNC: 12 SEC — SIGNIFICANT CHANGE UP (ref 9.95–12.87)
RBC # BLD: 5.27 M/UL — SIGNIFICANT CHANGE UP (ref 4.7–6.1)
RBC # FLD: 12.6 % — SIGNIFICANT CHANGE UP (ref 11.5–14.5)
SODIUM SERPL-SCNC: 138 MMOL/L — SIGNIFICANT CHANGE UP (ref 135–146)
WBC # BLD: 9.59 K/UL — SIGNIFICANT CHANGE UP (ref 4.8–10.8)
WBC # FLD AUTO: 9.59 K/UL — SIGNIFICANT CHANGE UP (ref 4.8–10.8)

## 2019-01-10 RX ORDER — IRBESARTAN 75 MG/1
1 TABLET ORAL
Qty: 0 | Refills: 0 | COMMUNITY

## 2019-01-10 NOTE — H&P PST ADULT - PSH
History of surgery  TOTAL DISC REPLACEMENT- NECK  LEFT  CTR  CARDIAC ABLATION- 8/18  Nasal septal deviation  sp nasal septum repair

## 2019-01-10 NOTE — H&P PST ADULT - PMH
Anxiety    Back pain    Class 2 obesity with body mass index (BMI) of 37.0 to 37.9 in adult, unspecified obesity type, unspecified whether serious comorbidity present    DM (diabetes mellitus)    HTN (hypertension)    Neck pain    Palpitations    Prediabetes    SVT (supraventricular tachycardia)

## 2019-01-10 NOTE — H&P PST ADULT - HISTORY OF PRESENT ILLNESS
52 Y/O MALE PRESENTS TO PAST WITH HX CARPAL TUNNEL, RIGHT PT NOW FOR SCHEDULED PROCEDURE. PT DENIES ANY CP SOB PALP COUGH DYSURIA FEVER URI. PT ABLE TO RAJ 1-2 FOS W/O SOB

## 2019-01-11 LAB
ESTIMATED AVERAGE GLUCOSE: 174 MG/DL — HIGH (ref 68–114)
HBA1C BLD-MCNC: 7.7 % — HIGH (ref 4–5.6)

## 2019-01-17 ENCOUNTER — OUTPATIENT (OUTPATIENT)
Dept: OUTPATIENT SERVICES | Facility: HOSPITAL | Age: 54
LOS: 1 days | Discharge: HOME | End: 2019-01-17

## 2019-01-17 VITALS
WEIGHT: 259.93 LBS | SYSTOLIC BLOOD PRESSURE: 130 MMHG | TEMPERATURE: 98 F | HEART RATE: 62 BPM | RESPIRATION RATE: 18 BRPM | HEIGHT: 72 IN | OXYGEN SATURATION: 96 % | DIASTOLIC BLOOD PRESSURE: 88 MMHG

## 2019-01-17 VITALS
DIASTOLIC BLOOD PRESSURE: 90 MMHG | OXYGEN SATURATION: 94 % | HEART RATE: 82 BPM | RESPIRATION RATE: 18 BRPM | SYSTOLIC BLOOD PRESSURE: 133 MMHG

## 2019-01-17 DIAGNOSIS — J34.2 DEVIATED NASAL SEPTUM: Chronic | ICD-10-CM

## 2019-01-17 DIAGNOSIS — Z98.890 OTHER SPECIFIED POSTPROCEDURAL STATES: Chronic | ICD-10-CM

## 2019-01-17 RX ORDER — ONDANSETRON 8 MG/1
4 TABLET, FILM COATED ORAL ONCE
Qty: 0 | Refills: 0 | Status: DISCONTINUED | OUTPATIENT
Start: 2019-01-17 | End: 2019-02-01

## 2019-01-17 RX ORDER — OXYCODONE AND ACETAMINOPHEN 5; 325 MG/1; MG/1
1 TABLET ORAL EVERY 4 HOURS
Qty: 0 | Refills: 0 | Status: DISCONTINUED | OUTPATIENT
Start: 2019-01-17 | End: 2019-01-17

## 2019-01-17 RX ORDER — METHOCARBAMOL 500 MG/1
1 TABLET, FILM COATED ORAL
Qty: 0 | Refills: 0 | COMMUNITY

## 2019-01-17 RX ORDER — SODIUM CHLORIDE 9 MG/ML
1000 INJECTION, SOLUTION INTRAVENOUS
Qty: 0 | Refills: 0 | Status: DISCONTINUED | OUTPATIENT
Start: 2019-01-17 | End: 2019-02-01

## 2019-01-17 RX ORDER — DILTIAZEM HCL 120 MG
1 CAPSULE, EXT RELEASE 24 HR ORAL
Qty: 0 | Refills: 0 | COMMUNITY

## 2019-01-17 RX ORDER — MORPHINE SULFATE 50 MG/1
2 CAPSULE, EXTENDED RELEASE ORAL
Qty: 0 | Refills: 0 | Status: DISCONTINUED | OUTPATIENT
Start: 2019-01-17 | End: 2019-01-17

## 2019-01-17 RX ORDER — IRBESARTAN 75 MG/1
1 TABLET ORAL
Qty: 0 | Refills: 0 | COMMUNITY

## 2019-01-17 RX ADMIN — SODIUM CHLORIDE 100 MILLILITER(S): 9 INJECTION, SOLUTION INTRAVENOUS at 13:10

## 2019-01-17 NOTE — BRIEF OPERATIVE NOTE - PROCEDURE
<<-----Click on this checkbox to enter Procedure Endoscopic carpal tunnel release of right wrist  01/17/2019    Active  [email or web address]

## 2019-01-23 DIAGNOSIS — E66.9 OBESITY, UNSPECIFIED: ICD-10-CM

## 2019-01-23 DIAGNOSIS — M54.2 CERVICALGIA: ICD-10-CM

## 2019-01-23 DIAGNOSIS — Z82.49 FAMILY HISTORY OF ISCHEMIC HEART DISEASE AND OTHER DISEASES OF THE CIRCULATORY SYSTEM: ICD-10-CM

## 2019-01-23 DIAGNOSIS — G56.01 CARPAL TUNNEL SYNDROME, RIGHT UPPER LIMB: ICD-10-CM

## 2019-01-23 DIAGNOSIS — I47.1 SUPRAVENTRICULAR TACHYCARDIA: ICD-10-CM

## 2019-01-23 DIAGNOSIS — E11.49 TYPE 2 DIABETES MELLITUS WITH OTHER DIABETIC NEUROLOGICAL COMPLICATION: ICD-10-CM

## 2019-01-23 DIAGNOSIS — Z79.82 LONG TERM (CURRENT) USE OF ASPIRIN: ICD-10-CM

## 2019-01-23 DIAGNOSIS — R00.2 PALPITATIONS: ICD-10-CM

## 2019-01-23 DIAGNOSIS — F41.9 ANXIETY DISORDER, UNSPECIFIED: ICD-10-CM

## 2019-01-23 DIAGNOSIS — Z80.6 FAMILY HISTORY OF LEUKEMIA: ICD-10-CM

## 2019-01-23 DIAGNOSIS — I10 ESSENTIAL (PRIMARY) HYPERTENSION: ICD-10-CM

## 2019-03-20 ENCOUNTER — OUTPATIENT (OUTPATIENT)
Dept: OUTPATIENT SERVICES | Facility: HOSPITAL | Age: 54
LOS: 1 days | Discharge: HOME | End: 2019-03-20

## 2019-03-20 DIAGNOSIS — J34.2 DEVIATED NASAL SEPTUM: Chronic | ICD-10-CM

## 2019-03-20 DIAGNOSIS — Z98.890 OTHER SPECIFIED POSTPROCEDURAL STATES: Chronic | ICD-10-CM

## 2019-03-20 DIAGNOSIS — M54.5 LOW BACK PAIN: ICD-10-CM

## 2019-03-20 PROBLEM — E11.9 TYPE 2 DIABETES MELLITUS WITHOUT COMPLICATIONS: Chronic | Status: ACTIVE | Noted: 2019-01-10

## 2019-03-21 ENCOUNTER — TRANSCRIPTION ENCOUNTER (OUTPATIENT)
Age: 54
End: 2019-03-21

## 2019-05-28 ENCOUNTER — LABORATORY RESULT (OUTPATIENT)
Age: 54
End: 2019-05-28

## 2019-05-28 ENCOUNTER — APPOINTMENT (OUTPATIENT)
Dept: CARDIOLOGY | Facility: CLINIC | Age: 54
End: 2019-05-28
Payer: MEDICARE

## 2019-05-28 ENCOUNTER — OUTPATIENT (OUTPATIENT)
Dept: OUTPATIENT SERVICES | Facility: HOSPITAL | Age: 54
LOS: 1 days | Discharge: HOME | End: 2019-05-28

## 2019-05-28 DIAGNOSIS — Z98.890 OTHER SPECIFIED POSTPROCEDURAL STATES: Chronic | ICD-10-CM

## 2019-05-28 DIAGNOSIS — J34.2 DEVIATED NASAL SEPTUM: Chronic | ICD-10-CM

## 2019-05-28 DIAGNOSIS — R70.0 ELEVATED ERYTHROCYTE SEDIMENTATION RATE: ICD-10-CM

## 2019-05-28 DIAGNOSIS — Z01.810 ENCOUNTER FOR PREPROCEDURAL CARDIOVASCULAR EXAMINATION: ICD-10-CM

## 2019-05-28 DIAGNOSIS — E75.6 LIPID STORAGE DISORDER, UNSPECIFIED: ICD-10-CM

## 2019-05-28 DIAGNOSIS — E11.9 TYPE 2 DIABETES MELLITUS WITHOUT COMPLICATIONS: ICD-10-CM

## 2019-05-28 DIAGNOSIS — I25.10 ATHEROSCLEROTIC HEART DISEASE OF NATIVE CORONARY ARTERY WITHOUT ANGINA PECTORIS: ICD-10-CM

## 2019-05-28 PROCEDURE — 99214 OFFICE O/P EST MOD 30 MIN: CPT

## 2019-05-28 PROCEDURE — 93000 ELECTROCARDIOGRAM COMPLETE: CPT

## 2019-06-14 NOTE — PACU DISCHARGE NOTE - NSCLINEINSERTRD_GEN_ALL_CORE
BP left arm sitting 106/59  HR 87 bpm  horacio 98.4 F  RR 15/min  O2 sat 99% on RA    84 yo female scheduled for  port placement on 6/17/19 with Dr. Guillen    1. UA, MRSA nares swab, BMP, CBC w diff  2. EKG  3. Medical clearance with PCP Dr Chris Harding, still awaiting clearance to be faxed to Dr Guillen or [facility]  4. discussed EZ sponges, NPO p MN & day of procedure instructions  5. Instructed to increase po fluids the day before surgery. Instructed to hold aspirin, NSAIDs, vitamins & herbals 7 days prior to surgery BP left arm sitting 106/59  HR 87 bpm  horacio 98.4 F  RR 15/min  O2 sat 99% on RA    82 yo female scheduled for  port placement on 6/17/19 with Dr. Guillen    1. UA, MRSA nares swab, BMP, CBC w diff  2. EKG  3. Medical clearance with PCP Dr Chris Harding. As per  in [facility] they are still awaiting clearance to be faxed to Dr Guillen or [facility] from PCP  4. discussed EZ sponges, NPO p MN & day of procedure instructions  5.  Instructed to increase po fluids the day before surgery. Instructed to hold aspirin, NSAIDs, vitamins & herbals 7 days prior to surgery. Pt & daughter Stella verbalized understanding No

## 2019-06-18 ENCOUNTER — APPOINTMENT (OUTPATIENT)
Dept: CARDIOLOGY | Facility: CLINIC | Age: 54
End: 2019-06-18

## 2019-06-28 ENCOUNTER — OUTPATIENT (OUTPATIENT)
Dept: OUTPATIENT SERVICES | Facility: HOSPITAL | Age: 54
LOS: 1 days | Discharge: HOME | End: 2019-06-28
Payer: MEDICARE

## 2019-06-28 DIAGNOSIS — Z98.890 OTHER SPECIFIED POSTPROCEDURAL STATES: Chronic | ICD-10-CM

## 2019-06-28 DIAGNOSIS — J34.2 DEVIATED NASAL SEPTUM: Chronic | ICD-10-CM

## 2019-06-28 DIAGNOSIS — M54.2 CERVICALGIA: ICD-10-CM

## 2019-06-28 PROCEDURE — 72050 X-RAY EXAM NECK SPINE 4/5VWS: CPT | Mod: 26

## 2019-07-08 ENCOUNTER — APPOINTMENT (OUTPATIENT)
Dept: CARDIOLOGY | Facility: CLINIC | Age: 54
End: 2019-07-08

## 2019-08-23 ENCOUNTER — APPOINTMENT (OUTPATIENT)
Dept: CARDIOLOGY | Facility: CLINIC | Age: 54
End: 2019-08-23
Payer: MEDICARE

## 2019-08-23 PROCEDURE — 99213 OFFICE O/P EST LOW 20 MIN: CPT

## 2019-08-23 PROCEDURE — 93000 ELECTROCARDIOGRAM COMPLETE: CPT

## 2019-11-25 ENCOUNTER — APPOINTMENT (OUTPATIENT)
Dept: CARDIOLOGY | Facility: CLINIC | Age: 54
End: 2019-11-25

## 2020-02-26 ENCOUNTER — APPOINTMENT (OUTPATIENT)
Dept: CARDIOLOGY | Facility: CLINIC | Age: 55
End: 2020-02-26

## 2020-07-13 ENCOUNTER — APPOINTMENT (OUTPATIENT)
Dept: CARDIOLOGY | Facility: CLINIC | Age: 55
End: 2020-07-13
Payer: MEDICARE

## 2020-07-13 PROCEDURE — 99213 OFFICE O/P EST LOW 20 MIN: CPT

## 2020-07-13 PROCEDURE — 93000 ELECTROCARDIOGRAM COMPLETE: CPT

## 2020-07-29 NOTE — H&P PST ADULT - SKIN
Render Note In Bullet Format When Appropriate: No
Duration Of Freeze Thaw-Cycle (Seconds): 5
Post-Care Instructions: I reviewed with the patient in detail post-care instructions. Patient is to wear sunprotection, and avoid picking at any of the treated lesions. Pt may apply Vaseline to crusted or scabbing areas.
Consent: The patient's consent was obtained including but not limited to risks of crusting, scabbing, blistering, scarring, darker or lighter pigmentary change, recurrence, incomplete removal and infection.
Number Of Freeze-Thaw Cycles: 2 freeze-thaw cycles
Detail Level: Detailed
No lesions; no rash

## 2020-08-12 ENCOUNTER — OUTPATIENT (OUTPATIENT)
Dept: OUTPATIENT SERVICES | Facility: HOSPITAL | Age: 55
LOS: 1 days | Discharge: HOME | End: 2020-08-12
Payer: MEDICARE

## 2020-08-12 DIAGNOSIS — M54.2 CERVICALGIA: ICD-10-CM

## 2020-08-12 DIAGNOSIS — Z98.890 OTHER SPECIFIED POSTPROCEDURAL STATES: Chronic | ICD-10-CM

## 2020-08-12 DIAGNOSIS — J34.2 DEVIATED NASAL SEPTUM: Chronic | ICD-10-CM

## 2020-08-12 PROCEDURE — 72125 CT NECK SPINE W/O DYE: CPT | Mod: 26

## 2020-09-03 ENCOUNTER — RECORD ABSTRACTING (OUTPATIENT)
Age: 55
End: 2020-09-03

## 2020-09-03 DIAGNOSIS — Z78.9 OTHER SPECIFIED HEALTH STATUS: ICD-10-CM

## 2020-09-03 DIAGNOSIS — M19.019 PRIMARY OSTEOARTHRITIS, UNSPECIFIED SHOULDER: ICD-10-CM

## 2020-09-03 DIAGNOSIS — Z87.898 PERSONAL HISTORY OF OTHER SPECIFIED CONDITIONS: ICD-10-CM

## 2020-09-03 DIAGNOSIS — I34.0 NONRHEUMATIC MITRAL (VALVE) INSUFFICIENCY: ICD-10-CM

## 2020-09-03 DIAGNOSIS — F41.9 ANXIETY DISORDER, UNSPECIFIED: ICD-10-CM

## 2020-09-03 DIAGNOSIS — I45.81 LONG QT SYNDROME: ICD-10-CM

## 2020-09-03 DIAGNOSIS — R06.02 SHORTNESS OF BREATH: ICD-10-CM

## 2020-09-03 DIAGNOSIS — I45.10 UNSPECIFIED RIGHT BUNDLE-BRANCH BLOCK: ICD-10-CM

## 2020-09-03 DIAGNOSIS — Z86.79 PERSONAL HISTORY OF OTHER DISEASES OF THE CIRCULATORY SYSTEM: ICD-10-CM

## 2020-09-03 DIAGNOSIS — I10 ESSENTIAL (PRIMARY) HYPERTENSION: ICD-10-CM

## 2020-09-03 RX ORDER — ASPIRIN 81 MG
81 TABLET, DELAYED RELEASE (ENTERIC COATED) ORAL DAILY
Refills: 0 | Status: ACTIVE | COMMUNITY

## 2020-09-03 RX ORDER — IRBESARTAN 300 MG/1
300 TABLET ORAL DAILY
Refills: 0 | Status: ACTIVE | COMMUNITY

## 2020-09-03 RX ORDER — METOPROLOL SUCCINATE 25 MG/1
25 TABLET, EXTENDED RELEASE ORAL DAILY
Refills: 0 | Status: ACTIVE | COMMUNITY

## 2020-09-23 ENCOUNTER — APPOINTMENT (OUTPATIENT)
Dept: CARDIOLOGY | Facility: CLINIC | Age: 55
End: 2020-09-23

## 2020-10-02 ENCOUNTER — RX RENEWAL (OUTPATIENT)
Age: 55
End: 2020-10-02

## 2020-10-02 RX ORDER — DILTIAZEM HYDROCHLORIDE 300 MG/1
300 CAPSULE, EXTENDED RELEASE ORAL
Qty: 90 | Refills: 3 | Status: ACTIVE | COMMUNITY
Start: 2020-10-02 | End: 1900-01-01

## 2020-10-20 ENCOUNTER — APPOINTMENT (OUTPATIENT)
Dept: GASTROENTEROLOGY | Facility: CLINIC | Age: 55
End: 2020-10-20
Payer: MEDICARE

## 2020-10-20 DIAGNOSIS — Z86.39 PERSONAL HISTORY OF OTHER ENDOCRINE, NUTRITIONAL AND METABOLIC DISEASE: ICD-10-CM

## 2020-10-20 DIAGNOSIS — Z86.79 PERSONAL HISTORY OF OTHER DISEASES OF THE CIRCULATORY SYSTEM: ICD-10-CM

## 2020-10-20 DIAGNOSIS — Z12.11 ENCOUNTER FOR SCREENING FOR MALIGNANT NEOPLASM OF COLON: ICD-10-CM

## 2020-10-20 DIAGNOSIS — Z78.9 OTHER SPECIFIED HEALTH STATUS: ICD-10-CM

## 2020-10-20 DIAGNOSIS — Z80.6 FAMILY HISTORY OF LEUKEMIA: ICD-10-CM

## 2020-10-20 PROCEDURE — 99204 OFFICE O/P NEW MOD 45 MIN: CPT | Mod: 95

## 2020-10-20 RX ORDER — PEG-3350, SODIUM SULFATE, SODIUM CHLORIDE, POTASSIUM CHLORIDE, SODIUM ASCORBATE AND ASCORBIC ACID 7.5-2.691G
100 KIT ORAL
Qty: 1 | Refills: 0 | Status: ACTIVE | COMMUNITY
Start: 2020-10-20 | End: 1900-01-01

## 2020-10-20 RX ORDER — PAROXETINE HYDROCHLORIDE 25 MG/1
25 TABLET, FILM COATED, EXTENDED RELEASE ORAL DAILY
Refills: 0 | Status: DISCONTINUED | COMMUNITY
End: 2020-10-20

## 2020-10-20 RX ORDER — METHOCARBAMOL 750 MG/1
750 TABLET, FILM COATED ORAL 3 TIMES DAILY
Qty: 90 | Refills: 0 | Status: DISCONTINUED | COMMUNITY
Start: 2018-01-31 | End: 2020-10-20

## 2020-10-20 RX ORDER — BUPROPION HYDROCHLORIDE 150 MG/1
150 TABLET, EXTENDED RELEASE ORAL
Refills: 0 | Status: ACTIVE | COMMUNITY

## 2020-10-20 RX ORDER — TADALAFIL 10 MG/1
10 TABLET, FILM COATED ORAL
Refills: 0 | Status: DISCONTINUED | COMMUNITY
End: 2020-10-20

## 2020-10-20 NOTE — PHYSICAL EXAM
[General Appearance - Alert] : alert [Hearing Threshold Finger Rub Not Sunflower] : hearing was normal [] : no respiratory distress [Oriented To Time, Place, And Person] : oriented to person, place, and time

## 2020-10-20 NOTE — ASSESSMENT
[FreeTextEntry1] : 55 year old male patient average risk for CRC, HTN, hx of SVT s/p ablation, presents for his first screening colonoscopy.  Denies any GI complaints. \par \par Needs screening colonoscopy \par Risks and benefits discussed with patient.\par

## 2020-10-20 NOTE — HISTORY OF PRESENT ILLNESS
[Home] : at home, [unfilled] , at the time of the visit. [Verbal consent obtained from patient] : the patient, [unfilled] [Medical Office: ([facility])___] : at the medical office located in  [FreeTextEntry4] : Alea Diaz [de-identified] : 55 year old male patient average risk for CRC, HTN, hx of SVT s/p ablation, presents for his first screening colonoscopy.  Denies any GI complaints.

## 2020-10-31 ENCOUNTER — EMERGENCY (EMERGENCY)
Facility: HOSPITAL | Age: 55
LOS: 0 days | Discharge: HOME | End: 2020-10-31
Attending: STUDENT IN AN ORGANIZED HEALTH CARE EDUCATION/TRAINING PROGRAM | Admitting: STUDENT IN AN ORGANIZED HEALTH CARE EDUCATION/TRAINING PROGRAM
Payer: MEDICARE

## 2020-10-31 VITALS
OXYGEN SATURATION: 100 % | TEMPERATURE: 98 F | DIASTOLIC BLOOD PRESSURE: 77 MMHG | HEART RATE: 70 BPM | SYSTOLIC BLOOD PRESSURE: 143 MMHG | RESPIRATION RATE: 19 BRPM

## 2020-10-31 VITALS
OXYGEN SATURATION: 99 % | DIASTOLIC BLOOD PRESSURE: 84 MMHG | TEMPERATURE: 98 F | HEART RATE: 83 BPM | HEIGHT: 72 IN | SYSTOLIC BLOOD PRESSURE: 134 MMHG | WEIGHT: 265 LBS | RESPIRATION RATE: 19 BRPM

## 2020-10-31 DIAGNOSIS — R42 DIZZINESS AND GIDDINESS: ICD-10-CM

## 2020-10-31 DIAGNOSIS — Z98.890 OTHER SPECIFIED POSTPROCEDURAL STATES: ICD-10-CM

## 2020-10-31 DIAGNOSIS — F41.8 OTHER SPECIFIED ANXIETY DISORDERS: ICD-10-CM

## 2020-10-31 DIAGNOSIS — Z79.82 LONG TERM (CURRENT) USE OF ASPIRIN: ICD-10-CM

## 2020-10-31 DIAGNOSIS — I10 ESSENTIAL (PRIMARY) HYPERTENSION: ICD-10-CM

## 2020-10-31 DIAGNOSIS — E11.9 TYPE 2 DIABETES MELLITUS WITHOUT COMPLICATIONS: ICD-10-CM

## 2020-10-31 DIAGNOSIS — Z79.899 OTHER LONG TERM (CURRENT) DRUG THERAPY: ICD-10-CM

## 2020-10-31 DIAGNOSIS — F41.9 ANXIETY DISORDER, UNSPECIFIED: ICD-10-CM

## 2020-10-31 DIAGNOSIS — H55.00 UNSPECIFIED NYSTAGMUS: ICD-10-CM

## 2020-10-31 DIAGNOSIS — J34.2 DEVIATED NASAL SEPTUM: Chronic | ICD-10-CM

## 2020-10-31 DIAGNOSIS — Z98.890 OTHER SPECIFIED POSTPROCEDURAL STATES: Chronic | ICD-10-CM

## 2020-10-31 LAB
ALBUMIN SERPL ELPH-MCNC: 4.5 G/DL — SIGNIFICANT CHANGE UP (ref 3.5–5.2)
ALP SERPL-CCNC: 103 U/L — SIGNIFICANT CHANGE UP (ref 30–115)
ALT FLD-CCNC: 57 U/L — HIGH (ref 0–41)
ANION GAP SERPL CALC-SCNC: 11 MMOL/L — SIGNIFICANT CHANGE UP (ref 7–14)
AST SERPL-CCNC: 30 U/L — SIGNIFICANT CHANGE UP (ref 0–41)
BASOPHILS # BLD AUTO: 0.08 K/UL — SIGNIFICANT CHANGE UP (ref 0–0.2)
BASOPHILS NFR BLD AUTO: 0.6 % — SIGNIFICANT CHANGE UP (ref 0–1)
BILIRUB SERPL-MCNC: 0.3 MG/DL — SIGNIFICANT CHANGE UP (ref 0.2–1.2)
BUN SERPL-MCNC: 17 MG/DL — SIGNIFICANT CHANGE UP (ref 10–20)
CALCIUM SERPL-MCNC: 10.2 MG/DL — HIGH (ref 8.5–10.1)
CHLORIDE SERPL-SCNC: 103 MMOL/L — SIGNIFICANT CHANGE UP (ref 98–110)
CO2 SERPL-SCNC: 19 MMOL/L — SIGNIFICANT CHANGE UP (ref 17–32)
CREAT SERPL-MCNC: 0.9 MG/DL — SIGNIFICANT CHANGE UP (ref 0.7–1.5)
EOSINOPHIL # BLD AUTO: 0.1 K/UL — SIGNIFICANT CHANGE UP (ref 0–0.7)
EOSINOPHIL NFR BLD AUTO: 0.8 % — SIGNIFICANT CHANGE UP (ref 0–8)
GLUCOSE SERPL-MCNC: 205 MG/DL — HIGH (ref 70–99)
HCT VFR BLD CALC: 44.6 % — SIGNIFICANT CHANGE UP (ref 42–52)
HGB BLD-MCNC: 15.5 G/DL — SIGNIFICANT CHANGE UP (ref 14–18)
IMM GRANULOCYTES NFR BLD AUTO: 0.6 % — HIGH (ref 0.1–0.3)
LYMPHOCYTES # BLD AUTO: 2.59 K/UL — SIGNIFICANT CHANGE UP (ref 1.2–3.4)
LYMPHOCYTES # BLD AUTO: 20.9 % — SIGNIFICANT CHANGE UP (ref 20.5–51.1)
MCHC RBC-ENTMCNC: 31.1 PG — HIGH (ref 27–31)
MCHC RBC-ENTMCNC: 34.8 G/DL — SIGNIFICANT CHANGE UP (ref 32–37)
MCV RBC AUTO: 89.4 FL — SIGNIFICANT CHANGE UP (ref 80–94)
MONOCYTES # BLD AUTO: 0.79 K/UL — HIGH (ref 0.1–0.6)
MONOCYTES NFR BLD AUTO: 6.4 % — SIGNIFICANT CHANGE UP (ref 1.7–9.3)
NEUTROPHILS # BLD AUTO: 8.76 K/UL — HIGH (ref 1.4–6.5)
NEUTROPHILS NFR BLD AUTO: 70.7 % — SIGNIFICANT CHANGE UP (ref 42.2–75.2)
NRBC # BLD: 0 /100 WBCS — SIGNIFICANT CHANGE UP (ref 0–0)
PLATELET # BLD AUTO: 428 K/UL — HIGH (ref 130–400)
POTASSIUM SERPL-MCNC: 5.4 MMOL/L — HIGH (ref 3.5–5)
POTASSIUM SERPL-SCNC: 5.4 MMOL/L — HIGH (ref 3.5–5)
PROT SERPL-MCNC: 7.7 G/DL — SIGNIFICANT CHANGE UP (ref 6–8)
RBC # BLD: 4.99 M/UL — SIGNIFICANT CHANGE UP (ref 4.7–6.1)
RBC # FLD: 12.1 % — SIGNIFICANT CHANGE UP (ref 11.5–14.5)
SODIUM SERPL-SCNC: 133 MMOL/L — LOW (ref 135–146)
TROPONIN T SERPL-MCNC: <0.01 NG/ML — SIGNIFICANT CHANGE UP
WBC # BLD: 12.39 K/UL — HIGH (ref 4.8–10.8)
WBC # FLD AUTO: 12.39 K/UL — HIGH (ref 4.8–10.8)

## 2020-10-31 PROCEDURE — 93010 ELECTROCARDIOGRAM REPORT: CPT

## 2020-10-31 PROCEDURE — 93010 ELECTROCARDIOGRAM REPORT: CPT | Mod: 77

## 2020-10-31 PROCEDURE — 99285 EMERGENCY DEPT VISIT HI MDM: CPT

## 2020-10-31 PROCEDURE — 70450 CT HEAD/BRAIN W/O DYE: CPT | Mod: 26

## 2020-10-31 PROCEDURE — 71045 X-RAY EXAM CHEST 1 VIEW: CPT | Mod: 26

## 2020-10-31 RX ORDER — SODIUM CHLORIDE 9 MG/ML
1000 INJECTION, SOLUTION INTRAVENOUS ONCE
Refills: 0 | Status: COMPLETED | OUTPATIENT
Start: 2020-10-31 | End: 2020-10-31

## 2020-10-31 RX ORDER — MECLIZINE HCL 12.5 MG
1 TABLET ORAL
Qty: 21 | Refills: 0
Start: 2020-10-31 | End: 2020-11-06

## 2020-10-31 RX ORDER — METOCLOPRAMIDE HCL 10 MG
10 TABLET ORAL ONCE
Refills: 0 | Status: COMPLETED | OUTPATIENT
Start: 2020-10-31 | End: 2020-10-31

## 2020-10-31 RX ORDER — MECLIZINE HCL 12.5 MG
50 TABLET ORAL ONCE
Refills: 0 | Status: COMPLETED | OUTPATIENT
Start: 2020-10-31 | End: 2020-10-31

## 2020-10-31 RX ADMIN — Medication 50 MILLIGRAM(S): at 08:30

## 2020-10-31 RX ADMIN — Medication 10 MILLIGRAM(S): at 08:00

## 2020-10-31 RX ADMIN — SODIUM CHLORIDE 1000 MILLILITER(S): 9 INJECTION, SOLUTION INTRAVENOUS at 08:30

## 2020-10-31 NOTE — ED PROVIDER NOTE - PHYSICAL EXAMINATION
Gen: Alert, NAD, well appearing  Head: NC, AT, PERRL, EOMI, normal lids/conjunctiva. + nystagmus  ENT: normal hearing. Ears: No TM erythema  Neck: +supple, no tenderness/meningismus,  Pulm: Bilateral BS, normal resp effort, no wheeze/stridor/retractions  CV: RRR, no murmer  Abd: soft, NT/ND  Mskel: no edema/erythema/cyanosis  Skin: no rash, warm/dry  Neuro: AAOx3, no sensory/motor deficits. CN 2-12 intact. Normal gait

## 2020-10-31 NOTE — ED PROVIDER NOTE - PROVIDER TOKENS
FREE:[LAST:[your PMD],PHONE:[(   )    -],FAX:[(   )    -],FOLLOWUP:[1-3 Days]],PROVIDER:[TOKEN:[[id number]],FOLLOWUP:[1-3 Days]]

## 2020-10-31 NOTE — ED PROVIDER NOTE - OBJECTIVE STATEMENT
54 yo M hx of anxiety, SVT, cardiac ablation c/o feeling dizzy x 5 days. Dizziness described as spinning/falling sensation and lightheaded. Symptoms are intermittent and worse with head movement. +nausea. + left ear discomfort.  Patient also c/o feeling anxious and having trouble sleeping since his Paxil was changed to Wellbutrin last month due to weight gait. No abnormal speech, weakness or numbness to extremities.  No HA, CP, or  abdominal pains. No f/c/v/c/d.

## 2020-10-31 NOTE — ED PROVIDER NOTE - NS ED ROS FT
Review of Systems    Constitutional: (-) fever, (-) chills  Eyes/ENT: (-) blurry vision, (-) epistaxis, (-) sore throat  Cardiovascular: (-) chest pain, (-) syncope  Respiratory: (-) cough, (-) shortness of breath  Gastrointestinal: (-) pain, (-) nausea, (-) vomiting, (-) diarrhea  Musculoskeletal: (-) neck pain, (-) back pain, (-) body aches  Integumentary: (-) rash, (-) edema  Neurological: (-) headache, (-) altered mental status, (+) dizzy  Psychiatric: (-) hallucinations, (+) anxiety, (-) suicidal/homicidal ideations  Allergic/Immunologic: (-) pruritus

## 2020-10-31 NOTE — ED PROVIDER NOTE - CLINICAL SUMMARY MEDICAL DECISION MAKING FREE TEXT BOX
55 year old male with a pmh of DM HTN anxiety/depression SVT presents here c/o dizziness x 5 days. Patient states dizziness is worsened upon standing and with b/l head movements. Dizziness is described as lightheadness and the room spining. VS reviewed. Labs imaging ekg obtained and reviewed. Medications given with improvement in symptoms. Patient a spoken to in detail about results  All questions addressed.  Results of ED work up discussed and patient given a copy of the results. Patient has proper follow up. Return precautions given.

## 2020-10-31 NOTE — ED PROVIDER NOTE - CARE PROVIDER_API CALL
your PMD,   Phone: (   )    -  Fax: (   )    -  Follow Up Time: 1-3 Days    Aman Morales)  Otolaryngology  [address] [place] [address], [address] [place] [address]  Phone: [phone]  Fax: [phone]  Follow Up Time: 1-3 Days

## 2020-10-31 NOTE — ED PROVIDER NOTE - PATIENT PORTAL LINK FT
You can access the FollowMyHealth Patient Portal offered by [facility] by registering at the following website: [email or web address]. By joining [facility]’s FollowMyHealth portal, you will also be able to view your health information using other applications (apps) compatible with our system.

## 2020-10-31 NOTE — ED PROVIDER NOTE - NSFOLLOWUPCLINICS_GEN_ALL_ED_FT
[facility]  Neurology  [address] [place] [address], [facility] [address]  [place] [address]  Phone: [phone]  Fax:   Follow Up Time: 1-3 Days

## 2020-10-31 NOTE — ED PROVIDER NOTE - ATTENDING CONTRIBUTION TO CARE
55 year old male with a pmh of DM HTN anxiety/depression SVT presents here c/o dizziness x 5 days. Patient states dizziness is worsened upon standing and with b/l head movements. Dizziness is described as lightheadness and the room spining. Symptoms associated with nausea no vomiting and slight ringing in left ear. Patient has also been c/o anxiety recently as well no si/hi. Denies any fever chills cough cp sob vomiting diarrhea or any new numbness/tingling.  On exam  CONSTITUTIONAL: WA / WN / NAD  HEAD: NCAT  EYES: PERRL; EOMI; + left beating nystagmus horizontally  ENT: Normal pharynx; mucous membranes pink/moist, no erythema.  NECK: Supple; no meningeal signs  CARD: RRR; nl S1/S2; no M/R/G.   RESP: Respiratory rate and effort are normal; breath sounds clear and equal bilaterally.  ABD: Soft, NT ND   MSK/EXT: No gross deformities; full range of motion.  SKIN: Warm and dry;   NEURO: AAOx3, Motor 5/5 x 4 extremities, Sensations intact to pain and palpation, Cerebellar testing normal. CN II-XII intact. Gait WNL No dysmetria.   PSYCH: Memory Intact, Normal Affect

## 2020-10-31 NOTE — ED ADULT NURSE NOTE - NSIMPLEMENTINTERV_GEN_ALL_ED
Implemented All Fall Risk Interventions:  [place] to call system. Call bell, personal items and telephone within reach. Instruct patient to call for assistance. Room bathroom lighting operational. Non-slip footwear when patient is off stretcher. Physically safe environment: no spills, clutter or unnecessary equipment. Stretcher in lowest position, wheels locked, appropriate side rails in place. Provide visual cue, wrist band, yellow gown, etc. Monitor gait and stability. Monitor for mental status changes and reorient to person, place, and time. Review medications for side effects contributing to fall risk. Reinforce activity limits and safety measures with patient and family.

## 2020-10-31 NOTE — ED PROVIDER NOTE - NSFOLLOWUPINSTRUCTIONS_ED_ALL_ED_FT
Dizziness    Dizziness is a common problem. It is a feeling of unsteadiness or light-headedness. You may feel like you are about to faint. This condition can be caused by a number of things, including medicines, dehydration, or illness. Drink enough fluid to keep your urine clear or pale yellow. Do not drink alcohol and limit your caffeine and salt intake. Avoid quick movement.  Rise slowly from chairs and steady yourself until you feel okay. In the morning, first sit up on the side of the bed.    SEEK IMMEDIATE MEDICAL CARE IF YOU HAVE THE FOLLOWING SYMPTOMS: vomiting, changes in your vision or speech, weakness in your arms or legs, trouble speaking or swallowing, chest pain, abdominal pain, shortness of breath, sweating, bleeding, headache, neck pain, or fever.    Vertigo  Vertigo is the feeling that you or your surroundings are moving when they are not. Vertigo can be dangerous if it occurs while you are doing something that could endanger you or others, such as driving.    What are the causes?  This condition is caused by a disturbance in the signals that are sent by your body’s sensory systems to your brain. Different causes of a disturbance can lead to vertigo, including:    Infections, especially in the inner ear.  A bad reaction to a drug, or misuse of alcohol and medicines.  Withdrawal from drugs or alcohol.  Quickly changing positions, as when lying down or rolling over in bed.  Migraine headaches.  Decreased blood flow to the brain.  Decreased blood pressure.  Increased pressure in the brain from a head or neck injury, stroke, infection, tumor, or bleeding.  Central nervous system disorders.    What are the signs or symptoms?  Symptoms of this condition usually occur when you move your head or your eyes in different directions. Symptoms may start suddenly, and they usually last for less than a minute. Symptoms may include:    Loss of balance and falling.  Feeling like you are spinning or moving.  Feeling like your surroundings are spinning or moving.  Nausea and vomiting.  Blurred vision or double vision.  Difficulty hearing.  Slurred speech.  Dizziness.  Involuntary eye movement (nystagmus).    Symptoms can be mild and cause only slight annoyance, or they can be severe and interfere with daily life. Episodes of vertigo may return (recur) over time, and they are often triggered by certain movements. Symptoms may improve over time.    How is this diagnosed?  This condition may be diagnosed based on medical history and the quality of your nystagmus. Your health care provider may test your eye movements by asking you to quickly change positions to trigger the nystagmus. This may be called the Hilton-Hallpike test, head thrust test, or roll test. You may be referred to a health care provider who specializes in ear, nose, and throat (ENT) problems (otolaryngologist) or a provider who specializes in disorders of the central nervous system (neurologist).    You may have additional testing, including:    A physical exam.  Blood tests.  MRI.  A CT scan.  An electrocardiogram (ECG). This records electrical activity in your heart.  An electroencephalogram (EEG). This records electrical activity in your brain.  Hearing tests.    How is this treated?  Treatment for this condition depends on the cause and the severity of the symptoms. Treatment options include:    Medicines to treat nausea or vertigo. These are usually used for severe cases. Some medicines that are used to treat other conditions may also reduce or eliminate vertigo symptoms. These include:    Medicines that control allergies (antihistamines).  Medicines that control seizures (anticonvulsants).  Medicines that relieve depression (antidepressants).  Medicines that relieve anxiety (sedatives).    Head movements to adjust your inner ear back to normal. If your vertigo is caused by an ear problem, your health care provider may recommend certain movements to correct the problem.  Surgery. This is rare.    Follow these instructions at home:  Safety     Move slowly.Avoid sudden body or head movements.  Avoid driving.  Avoid operating heavy machinery.  Avoid doing any tasks that would cause danger to you or others if you would have a vertigo episode during the task.  If you have trouble walking or keeping your balance, try using a cane for stability. If you feel dizzy or unstable, sit down right away.  Return to your normal activities as told by your health care provider. Ask your health care provider what activities are safe for you.  General instructions     Take over-the-counter and prescription medicines only as told by your health care provider.  Avoid certain positions or movements as told by your health care provider.  Drink enough fluid to keep your urine clear or pale yellow.  Keep all follow-up visits as told by your health care provider. This is important.  Contact a health care provider if:  Your medicines do not relieve your vertigo or they make it worse.  You have a fever.  Your condition gets worse or you develop new symptoms.  Your family or friends notice any behavioral changes.  Your nausea or vomiting gets worse.  You have numbness or a “pins and needles” sensation in part of your body.  Get help right away if:  You have difficulty moving or speaking.  You are always dizzy.  You faint.  You develop severe headaches.  You have weakness in your hands, arms, or legs.  You have changes in your hearing or vision.  You develop a stiff neck.  You develop sensitivity to light.

## 2021-03-17 PROBLEM — Z00.00 ENCOUNTER FOR PREVENTIVE HEALTH EXAMINATION: Noted: 2021-03-17

## 2021-05-25 NOTE — PATIENT PROFILE ADULT. - NS PRO MODE OF ARRIVAL
Symptomatic care is recommended. Take all medications as prescribed and instructed. Follow up with your primary care as directed or return to Emergency Department with worsening of symptoms.  
stretcher

## 2021-06-22 ENCOUNTER — APPOINTMENT (OUTPATIENT)
Dept: PLASTIC SURGERY | Facility: CLINIC | Age: 56
End: 2021-06-22
Payer: MEDICARE

## 2021-06-22 VITALS — BODY MASS INDEX: 35.21 KG/M2 | HEIGHT: 72 IN | WEIGHT: 260 LBS

## 2021-06-22 PROCEDURE — 99072 ADDL SUPL MATRL&STAF TM PHE: CPT

## 2021-06-22 PROCEDURE — 99203 OFFICE O/P NEW LOW 30 MIN: CPT

## 2021-06-22 RX ORDER — PAROXETINE HYDROCHLORIDE 10 MG/1
10 TABLET, FILM COATED ORAL
Refills: 0 | Status: ACTIVE | COMMUNITY

## 2021-06-22 RX ORDER — GLUCOSAMINE/MSM/CHONDROIT SULF 500-166.6
500 TABLET ORAL
Refills: 0 | Status: ACTIVE | COMMUNITY

## 2021-06-22 RX ORDER — PSYLLIUM HUSK 0.4 G
CAPSULE ORAL
Refills: 0 | Status: ACTIVE | COMMUNITY

## 2021-06-22 RX ORDER — PIOGLITAZONE HYDROCHLORIDE 15 MG/1
15 TABLET ORAL
Refills: 0 | Status: ACTIVE | COMMUNITY

## 2021-06-22 RX ORDER — ATORVASTATIN CALCIUM 10 MG/1
10 TABLET, FILM COATED ORAL
Refills: 0 | Status: ACTIVE | COMMUNITY

## 2021-06-22 RX ORDER — SEMAGLUTIDE 1.34 MG/ML
2 INJECTION, SOLUTION SUBCUTANEOUS
Refills: 0 | Status: ACTIVE | COMMUNITY

## 2021-06-22 NOTE — ASSESSMENT
[FreeTextEntry1] : 54 y/o M with atypical skin lesion to chest\par \par as above\par office procedure to excise central chest atypical compound nevus\par \par Regarding the procedure, we discussed scarring, poor wound healing, bleeding, infection, need for additional surgery, and dissatisfaction with the outcome.  Also discussed possibility of keloid and/or hypertrophic scar formation as well as recurrence.  All questions were answered and risks understood.\par \par office procedure after summer\par \par all ?s asnwered\par \par hold asa and fish oil one week prior

## 2021-06-22 NOTE — PHYSICAL EXAM
[de-identified] : well-appearing, NAD [de-identified] : Central chest with 1.1 x 1.5 cm healing biopsy site, rolled borders with ulcerative center

## 2021-06-22 NOTE — HISTORY OF PRESENT ILLNESS
[FreeTextEntry1] : Pt is a 54 y/o M with PMH od SVT s/p ablation, HTN, HLD, and DM who presents for evaluation of atypical skin lesion to chest. Pt states he has had it for several years but noticed recent changes in appearance. Biopsy done 6/10/21 shows atypical compound melanocytic nevus with positive margins. Denies personal h/o skin cancer.\par \par Family h/o skin cancer: Mother\par \par Nonsmoker

## 2021-10-08 ENCOUNTER — APPOINTMENT (OUTPATIENT)
Dept: PLASTIC SURGERY | Facility: CLINIC | Age: 56
End: 2021-10-08

## 2022-04-12 NOTE — DISCHARGE NOTE ADULT - LEARNING BEHAVIORAL ACTIVITIES TO COPE WITH URGES. FOR EXAMPLE, DISTRACTION AND CHANGING ROUTINES.
Patient is doing well, denies complaints  Had her anatomy scan today: Estimated FW: 330 gm. 0 lb 12 oz 30 %Tile  Anatomy, placental location and amniotic fluid volume within normal limits  Discussed pediatrician/prenatal classes if applicable  Discussed COVID vaccine recommendations in pregnancy if not vaccinated  RTC in 4 weeks for next OB check     Statement Selected

## 2022-04-27 NOTE — ED ADULT NURSE NOTE - CAS DISCH ACCOMP BY
LSW completed chart review, pt has not experienced any psychosocial needs in the past 3 months  LSW is closing episode and will remain available for new referrals as needed  EDT

## 2022-05-10 NOTE — H&P ADULT - FAMILY HISTORY
Father  Still living? No  Family history of leukemia, Age at diagnosis: Age Unknown     Mother  Still living? Yes, Estimated age: Age Unknown  Family history of chronic obstructive lung disease, Age at diagnosis: Age Unknown     Aunt  Still living? No  Family history of early CAD, Age at diagnosis: Age Unknown Cibinqo Counseling: I discussed with the patient the risks of Cibinqo therapy including but not limited to common cold, nausea, headache, cold sores, increased blood CPK levels, dizziness, UTIs, fatigue, acne, and vomitting. Live vaccines should be avoided.  This medication has been linked to serious infections; higher rate of mortality; malignancy and lymphoproliferative disorders; major adverse cardiovascular events; thrombosis; thrombocytopenia and lymphopenia; lipid elevations; and retinal detachment.

## 2022-06-19 ENCOUNTER — NON-APPOINTMENT (OUTPATIENT)
Age: 57
End: 2022-06-19

## 2022-06-21 ENCOUNTER — APPOINTMENT (OUTPATIENT)
Dept: PAIN MANAGEMENT | Facility: CLINIC | Age: 57
End: 2022-06-21
Payer: MEDICARE

## 2022-06-21 PROCEDURE — 93770 DETERMINATION VENOUS PRESS: CPT | Mod: 59

## 2022-06-21 PROCEDURE — 94761 N-INVAS EAR/PLS OXIMETRY MLT: CPT | Mod: 59

## 2022-06-21 PROCEDURE — 62321 NJX INTERLAMINAR CRV/THRC: CPT

## 2022-06-21 PROCEDURE — 96040: CPT

## 2022-06-21 NOTE — PROCEDURE
[FreeTextEntry1] : CERVICAL EPIDURAL STEROID INJECTION UNDER FLUOROSCOPY [FreeTextEntry3] : Preoperative Diagnosis: Cervical radiculopathy\par \par Postoperative Diagnosis: Cervical radiculopathy\par \par Procedure: Translaminar Cervical Epidural Injection under fluoroscopy\par \par Physician: Timothy Ballard D.O.\par \par \par \par \par Anesthesia: See nurses note. /Cold spray.\par \par \par \par \par Medical Necessity:  Failure of conservative management.\par \par \par \par \par CONSENT: The possible complications including infection, bleeding, nerve damage, hospital admission, death or failure of the procedure; though unusual, are theoretically possible. The patient was educated about the of the procedure and alternative therapies. All questions were answered and the patient freely gave consent to proceed.\par \par \par \par \par Indication for Fluoroscopy:  This procedure requires the precise placement of the spinal needle.  It is the only way to accurately and safely perform the injection.\par \par \par \par \par Monitoring:  Patient had continuous blood pressure, EKG, and pulse oximetry throughout the case. See nurse's notes.\par \par  \par \par After obtaining written consent, the After obtaining written consent, the patient was positioned prone on the fluoroscopy table. The back to her neck and upper thorax was prepped with Betadine and draped in usual sterile fashion. A time out was performed. The C7-T1 interspace was identified using fluoroscopy. The skin was infiltrated with lidocaine 2% -- 1 cc for subcutaneous analgesia.  The epidural space was identified using a 17g touhy needle with a midline approach using a loss of resistance technique. 2cc omnipaque was used to define the space. A solution of 5 ml of preservative-free sterile saline and 1ml of Methylprednisolone 80mg, 1cc was infused with minimal pressure on the syringe into the epidural space. The procedure was tolerated well. There was no evidence of CSF, Paresthesias nor heme. The needle was removed intact. A band aid was place on the site.\par \par  \par \par Epidurogram:  No signs of epidural fibrosis.\par \par  \par \par Findings: Cervical spine x-ray with AP and oblique views, C4-6 degenerative changes noted.\par \par  \par \par  \par \par Complications: None. The patient tolerated the procedure well.\par \par  \par \par Disposition: I have examined the patient and there are no new physical findings since the original presentation.  Sensory and motor function were intact. The patient met discharge criteria see nurses notes. The discharge instruction sheet was reviewed and given to the patient. The patient was discharged home with a .  If patient gets sustained relief will have patient do shoulder griddle strengthening with Thera bands and walking.\par \par  \par \par Comment: 1st DELIO today, schedule 2nd in 1-2 weeks depending of effectiveness vs follow up in office depending on the insurance. Call if any problems. \par \par  \par \par This document was electronically signed by:\par \par \par \par \par Timothy Ballard D.O.\par \par  Diplomat, American Board of Anesthesiology\par \par  Diplomat, American Board of Pain Medicine\par \par  Diplomat, American Board of Pain Management\par \par \par \par \par \par \par

## 2022-06-22 ENCOUNTER — APPOINTMENT (OUTPATIENT)
Dept: PAIN MANAGEMENT | Facility: CLINIC | Age: 57
End: 2022-06-22

## 2022-07-20 ENCOUNTER — APPOINTMENT (OUTPATIENT)
Dept: PAIN MANAGEMENT | Facility: CLINIC | Age: 57
End: 2022-07-20

## 2022-12-30 NOTE — ED ADULT TRIAGE NOTE - BP NONINVASIVE DIASTOLIC (MM HG)
100 Price (Do Not Change): 0.00 Instructions: This plan will send the code FBSE to the PM system.  DO NOT or CHANGE the price. Detail Level: Generalized

## 2023-04-22 NOTE — ASU PATIENT PROFILE, ADULT - PT NEEDS ASSIST
0856 Called and informed daughter Wendy Kahn, pt is being picked up at this this time.   
WOC consult for specialty bed.    LISETTE specialty bed ordered.    Sensory Perception: 2-->very limited  Moisture: 3-->occasionally moist  Activity: 1-->bedfast  Mobility: 2-->very limited  Nutrition: 2-->probably inadequate  Friction and Shear: 1-->problem  Hari Score: 11 (04/16/23 8635)    Pressure Injury Prevention Protocol (initiate for Hari Score of 18 or less):   *Keep skin dry, turn q 2 hr, keep heels elevated and offloaded with offloading heel boots.    *Apply z-guard to bottom and bony prominences daily and as needed with incontinence episodes.  *Follow C.A.R.E protocol if medical devices (Bipap, kennedy, Ng tube, etc) are being used.  *Reduce layers under patient (one sheet as drawsheet and two incontinence pads) to allow LISETTE to improve microclimate   *Clean skin gently with no-rinse PH-balanced cleanser and soft, disposable cloth (barrier wipes-blue pack).   *Raise knee-gatch before elevating HOB to reduce shearing        
no

## 2023-11-15 NOTE — ED PROVIDER NOTE - CROS ED CARDIOVAS ALL NEG
"Verbal consent of the patient and/or verbal parental consent for patients under the age of 18 have been obtained to conduct a physical examination at this office visit.    Established patient  History Of Present Illness  11/20/23 Juan Rodriguez is a 48 y.o. male who presents for reevaluation of his RIGHT shoulder. He has completed 6 to 8 weeks of physical Therapy and continues to perform his home exercise program as directed by PT. He reports no improvement in functionality and pain since his last evaluation. He describes his pain as an aching, sharp and stabbing pain along the lateral aspect of his right shoulder proximal to the insertion of his biceps muscle. He notes exacerbation of pain with overhead motions, when lifting objects into his truck bed, or when putting any amount of weight onto his left arm/shoulder. He also states when throwing softball with his daughter pain will increase. He rates his pain at 1/10 today. He has a home TENS unit and takes OTC Ibuprofen and OTC Tylenol as well as applying heat and ice topically for pain management with no relief.  He says is also affecting his everyday activities as well as his sleep at night.      Last Recorded Vitals  /82   Pulse 62   Ht 1.778 m (5' 10\")   Wt 81.6 kg (180 lb)   BMI 25.83 kg/m²      All previous Progress Notes and imaging results related to this patients chief complaint have been reviewed in preparation for this examination.    Past Medical History  He has no past medical history on file.    Surgical History  He has no past surgical history on file.     Social History  He reports that he has never smoked. He has never used smokeless tobacco. He reports that he does not currently use alcohol. He reports that he does not use drugs.    Family History  No family history on file.     Allergies  Patient has no known allergies.    Historical Clinical Intake  March 13, 2023 Verbal consent of the patient and/or verbal parental consent for " patients under the age of 18 have been obtained to conduct a physical examination at this office visit. Juan is a 47 year old male who presents as a new patient who is here for an initial evaluation of his RIGHT shoulder. He initially injured his RIGHT shoulder approximately 1/2 - 2 years ago while throwing with his daughter. He had his right arm cocked back to throw the ball and felt a sharp pain primarily in the anterior aspect of his RIGHT shoulder. He denies feeling a pop, snap, or crack at that time. He denies any c/o instability. Since that time, Adria has had intermittent pain in his right shoulder that is worse with throwing and with weight-bearing activities such as a plank. He states that the pain varies from light to severe pain. Worse with throwing, weight bearing activty. Pain can get up to 8/10, currently 0/10. He denies any numbness/tingling. He is right hand dominant and works a physical job as a  and . Additionally, he does have issues with bilateral elbows that he would like evaluated at a separate visit. 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    Review of Systems:  CONSTITUTIONAL:   Negative for weight change, loss of appetite, fatigue, weakness, fever, chills, night sweats, headaches .           HEENT:   Negative for cold, cough, sore throat, sinus pain, swollen lymph nodes.           OPHTHALMOLOGY:   Negative for diminished vision, blurred vision, loss of vision, double vision.           ALLERGY:   Negative for runny nose, scratchy throat, sinus congestion, rash, facial pressure, nasal congestion, post-nasal drip.           CARDIOLOGY:   Negative for chest pain, palpitations, murmurs, irregular heart beat,  shortness of breath, leg edema, dyspnea on exertion, fatigue, dizziness.           RESPIRATORY:   Negative for chest pain, shortness of breath, swelling of the legs, asthma/copd, chest congestion, pain with breathing .           GASTROENTEROLOGY:   Negative for nausea, vomitting, heartburn, constipation, diarrhea, blood in stool, change in bowel habits, black stool.           HEMATOLOGY/LYMPH:   Negative for fatigue, loss of appetitie, easy bruising, easy bleeding, anemia, abnormal bleeding, slow healing.           ENDOCRINOLOGY:   Negative for polyuria, polydipsia, polyphagia, fatigue, weight loss, weight gain, cold intolerance, heat intolerance, diabetes.           MUSCULOSKELETAL:   Positive  for RIGHT SHOULDER        DERMATOLOGY:   Negative for rash, bruising.           NEUROLOGY:   Negative for tingling, numbness, gait abnormality, paresthesias, weakness, sciatica.         General Examination:  GENERAL APPEARANCE: Appears well, pleasant, and cooperative, NAD.   HEENT: Normal, unremarkable.   NECK: Supple.   HEART: RRR, normal S1S2.   LUNGS: Clear to auscultation bilaterally.   ABDOMEN: Soft, NT/ND, BS present.   EXTREMITIES: No cyanosis, clubbing.   SKIN: No rash or cellulitis.   NEUROLOGIC EXAM: Awake, A&O x 3, CN's II-XII grossly intact.   PSYCH: Good eye contact, appropriate mood and affect        General (SHOULDER):  Location: RIGHT.   Erythema: Negative.   Edema:  Negative.   Effusion:  Negative.   Warmth:  Negative.   Ecchymosis/Bruising:  Negative.   Percussion Test:  Negative.   Tuning Fork Test:  Negative.   Abrasions:  Negative.   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   ROM: Positive: , asymmetrical, additionally noted secondary decreased range of motion of forearms into supination due to congenital birth defect BILATERAL  Forward Flexion (0-180  degrees)  Extension (0-60 degrees)  ABduction (0-180 degrees)  ADduction (30-50 degrees)  External Rotation with elbow at side (0-90 degrees) RIGHT worse than LEFT  Internal Rotation with elbow at side (0-70 degrees)  Horizontal ABduction (0-90 degrees)  Horizontal ADduction (0-45 degrees)  External Rotation with elbow at 90 degrees of ABduction [0-() degrees]  Internal Rotation with elbow at 90 degrees of ABduction [0-(70-90) degrees]  Apley's Shoulder Scratch Test Superiorly Tests a Combination of: Flexion, External Rotation, and Scapular ABduction  Apley's Shoulder Scratch Test Inferiorly Tests a Combination of: Extension, Internal Rotation, and Scapular Adduction  LEFT arm reaches T4 RIGHT arm reaches T11            Muscle Strength:   Positive  +3/+5: Internal Rotation - subscapularis  +3/+5: External Rotation - infraspinatus and teres minor RIGHT>LEFT  +3/+5: ABduction - supraspinatus and deltoid  +3/+5: ABduction with thumbs down and 30 degrees horizontal ADduction - supraspinatus  +3/+5: Palms up with elbow bent to 15 degrees flexion and resisted upward motion - biceps  +3/+5: Simultaneous resisted supination and elbow flexion- biceps  +3/+5: Flexion  +3/+5: Extension  +3/+5: ABduction  +3/+5: ADduction  +3/+5: Internal Rotation at 90 Degrees  +3/+5: External Rotation at 90 Degrees  +3/+5: Internal Rotation at 0 Degrees  +3/+5: External Rotation at 0 Degrees  +3/+5: Apley's Shoulder Scratch Test Superiorly Tests a Combination of: Flexion, External Rotation, and Scapular ABduction  +3/+5:Apley's Shoulder Scratch Test Inferiorly Tests a Combination of: Extension, Internal Rotation, and Scapular ADduction  +3/+5: Triceps  +3/+5: Biceps            DTR/Neurological:   Negative, Symmetrical:  +2/+4 DTR's: Biceps Brachi (C5)  +2/+4 DTR's: Brachioradialis (C6)  +2/+4 DTR's: Triceps (C7).            Sensation/Neurological:    Negative, Symmetrical, Sensation intact:  C2: Lower jaw and back of head  C3: Upper  neck and back of head  C4: Lower neck, upper shoulders and upper chest  C5: Area of collarbones, lateral upper arms and upper chest  C6: Lateral forearms, thumbs, anterior index finger and lateral half of the middle finger  C7: Some of posterior index finger, medial half of middle finger, upper posterior back, and back of arms  C8: Ring finger, little finger, medial forearm and posterior upper back  T1: Medial anterior upper arm, axilla, upper chest, and posterior back     Palpation:  Positive: , , Tenderness to Palpation over the rotator cuff interval and long head of the biceps tendon proximally.            Vascular: Negative, Symmetrical:  +2/+4: Carotid pulse  +2/+4: Radial pulse  +2/+4: Ulnar pulse  +2/+4: Brachial pulse            Negative, Symmetrical:  Capillary Refill less than 2 seconds .     Winging Scapula:  Positive: ,Bilateral.     Imaging and Diagnostics Review:  PROCEDURE: SHOULDER RT MIN 2 VIEW - TXR 0048  REASON FOR EXAM: RIGHT SHOULDER PAIN,UNSPECIFIED CHRONICITY  RESULT: MRN: [id number]     Patient Name: MAMIE DANIEL  STUDY: SHOULDER RT MIN 2 VIEW; 3/13/2023 4:48 pm  INDICATION: RIGHT SHOULDER PAIN,UNSPECIFIED CHRONICITY 47-year-old man with right shoulder pain, generalized.  COMPARISON: Left shoulder radiograph 01/04/2019  ACCESSION NUMBER(S): [id number]  ORDERING CLINICIAN: DENIZ ARMSTRONG    TECHNIQUE: 3 views of the right shoulder were performed.    FINDINGS:   No sign of acute right shoulder fracture or dislocation. The right acromioclavicular joint space demonstrates mild narrowing. The glenohumeral joint space overall appears maintained. Incidental curvilinear radiopaque density overlying the lower neck may be related to the patient's clothing/external to the patient. Correlation with physical examination advised.            IMPRESSION:  1. Mild degenerative change of the right acromioclavicular joint.  2. No sign of acute fracture or dislocation.  3. If symptoms persist, consider MRI for  "further evaluation.            Dictation workstation: [id number]  Original Interpreting Physician: TOÑO DURAN MD  Original Transcribed by/Date: MMODAL Mar 13 2023 4:21P            BILATERAL elbows: possible birth defect or defect of no known origin noted of the elbows: radial heads are approximately 4 times the size limiting the patient's ability to supinate or other biomechanics involving the elbow, wrist, and possibly shoulders.    Shoulder - AC Joint:  Painful Arc 120-180 degrees:  Negative.   AC Compression Test:  Negative.   Cross Body ADduction Stress Test:  Negative.   Piano Key Sign:  Negative.   AC Distraction Test:  Negative.     Shoulder - Biceps:  Abbott-Clancy Test: Negative.   Speeds Test: Negative.   Yerganson Test: Negative.     Shoulder - Impingement:  Neer Test:  Negative.   Hawken-Kaiser Test:  Negative.   Painful Arc  degrees:  Negative.     Shoulder - Infraspinatus:  Resisted ER at 0 degrees ABduction:  Negative with arm at side.     Shoulder - Labrum/Instability:  Anterior Apprehension Test:  Positive.   Anterior Release/Surprise Test:  Positive.   Anterior Drawer Test:  Positive.   Bicep Flexion at 90 degrees ABduction Test:  Positive.   Bicipital Load Test:  Positive.   Posterior Apprehension Test:  Positive.   Relocation Test:  Positive.   Sulcus Sign:  Positive.   Brand Test:  Positive.   Anterior Slide Test:  Positive.   Clunk Test:  Positive.   Grind Test:  Positive.   [place]lands Test:  Positive.   Crank Test:  Positive.   Load and Shift Test:  Positive.     Shoulder - Subscapularis:  Bear Hug Test:  Negative.   Lift Off Test:  Positive.   Jim Thorpe Test: Positive. patient states he starts to feel discomfort/pain with completion of this test near the site of usual pain.   Resisted Elbow Push Down Test:  Positive..   Resisted IR at 90 degrees:  Negative.   Resisted Lift Off \"\" Test:  Positive.     Shoulder - Supraspinatus:  Full Can Test:  Positive.: with weakness  Empty " Can Test: Positive.. with weakness  Resisted Elbow Push Up Test:  Positive.  Drop Arm Test:  Negative.   Torrie Test:  Negative.   Lateral Torrie Test:  Negative.   Zero Degree ABduction Test:  Negative.   Painful Arc  degrees:  Negative  degrees.     Shoulder - Teres Minor:  Resisted ER at 90 degrees ABduction:  Negative.     Shoulder - Vascular Thoracic Outlet Syndrome:  Shabbir Test: Negative.   Adson's Test: Negative.   Ankur's Test: Negative.   Robles Test: Negative.   Sacha Test: Negative.       Assessment   1. Right shoulder pain, unspecified chronicity  MR arthrogram shoulder right    XR arthrogram shoulder right    FL guided aspiration or injection large joint right      2. Injury of glenoid labrum, right, subsequent encounter  MR arthrogram shoulder right    XR arthrogram shoulder right    FL guided aspiration or injection large joint right      3. Strain of right biceps, subsequent encounter  MR arthrogram shoulder right    XR arthrogram shoulder right    FL guided aspiration or injection large joint right      4. Strain of right rotator cuff capsule, subsequent encounter  MR arthrogram shoulder right    XR arthrogram shoulder right    FL guided aspiration or injection large joint right      5. DJD of AC (acromioclavicular) joint  MR arthrogram shoulder right    XR arthrogram shoulder right    FL guided aspiration or injection large joint right      6. Winged scapula of both sides  MR arthrogram shoulder right    XR arthrogram shoulder right    FL guided aspiration or injection large joint right      7. Birth defect  MR arthrogram shoulder right    XR arthrogram shoulder right    FL guided aspiration or injection large joint right          Treatment or Intervention:   May continue to alternate moist heat and ice as needed for pain management   Once again,reviewed rotator cuff injury and/or labral injury in detail with the patient to the level of their understanding;  at the time of this office  visit.    Continue Physical Therapy exercises daily   once again, recommendation over-the-counter  vitamin-D 2 -3000+ milligrams a day, as well as  a daily multivitamin.    Once again, recommendation over-the-counter curcumin, turmeric, boswellia, as well as egg shell membrane as directed to aid with joint inflammation.    Once again, recommendation to continue over-the-counter Move Free for joint health.    May continue to alternate between Advil liquid gels and Tylenol every 6-8 hours to alleviate pain as needed.    Patient advised regarding the risks and/or potential adverse reactions and/or side effects of any prescribed medications along with any over-the-counter medications or any supplements used. Patient advised to seek immediate medical care if any adverse reactions occur. The patient and/or patient(s) parent(s) verbalized their understanding.  Once again, discussed in detail with the patient to the level of their understanding the possibility in the future of regenerative injections versus corticosteroid injections  Once again, will order again since failed physical therapy MR arthrogram of the RIGHT shoulder to rule out rotator cuff tear versus labral pathology as well as further evaluation of the proximal biceps tendon versus fracture versus other  Patient aware of the possibility of surgical intervention in the future however at this time would like to avoid surgical intervention if possible  Discussed with patient better ways and proper technique for workouts with modifications  Follow up after MR arthrogram of the RIGHT shoulder or sooner if needed.         Diagnostic studies:  No additional imaging or laboratory studies are needed at this time.    Activity Instructions, Restrictions, and Accommodations:  The family has been provided a note (after visit summary) outlining all current activity instructions, restrictions, and accommodations.    Consultations/Referrals:  None at this time.    Follow-up    Follow up after MR arthrogram of the RIGHT shoulder or sooner if needed. I, Sumi Lizama, attest this documentation has been prepared under the direction and in the presence of El David D.O. By signing below, I, Mulugeta David D.O, personally performed the services described in this documentation. All medical record entries made by the scribe were at my direction and in my presence. I have reviewed the chart and agree that the record reflects my personal performance and is accurate and complete. Please note that this report has been partially produced using speech recognition software. It may contain errors related to grammar, punctuation or spelling. Electronically signed, but not reviewed. Mulugeta David D.O. Director of Sports Medicine.     SUMI LIZAMA on 11/20/23 at 9:07 AM.     Mulugeta David DO, FAOASM   negative...

## 2023-12-28 NOTE — ED PROVIDER NOTE - DISCUSSED CLINICAL AND RADIOLOGICAL FINDINGS WITH, MDM
patient/family
Text: The above diagnosis and findings were noted on the exam but not discussed at this time with the patient.
Detail Level: Zone

## 2024-01-29 NOTE — ED ADULT NURSE NOTE - NS ED NURSE LEVEL OF CONSCIOUSNESS SPEECH
[Well Developed] : well developed [Well Nourished] : well nourished [No Acute Distress] : no acute distress [Normal Conjunctiva] : normal conjunctiva [Normal Venous Pressure] : normal venous pressure [No Carotid Bruit] : no carotid bruit [Normal S1, S2] : normal S1, S2 [No Murmur] : no murmur [No Rub] : no rub Speaking Coherently [No Gallop] : no gallop [Clear Lung Fields] : clear lung fields [Good Air Entry] : good air entry [No Respiratory Distress] : no respiratory distress  [Soft] : abdomen soft [Non Tender] : non-tender [No Masses/organomegaly] : no masses/organomegaly [Normal Bowel Sounds] : normal bowel sounds [Normal Gait] : normal gait [No Edema] : no edema [No Cyanosis] : no cyanosis [No Clubbing] : no clubbing [No Varicosities] : no varicosities [No Rash] : no rash [No Skin Lesions] : no skin lesions [Moves all extremities] : moves all extremities [No Focal Deficits] : no focal deficits [Normal Speech] : normal speech [Alert and Oriented] : alert and oriented [Normal memory] : normal memory

## 2024-05-03 NOTE — ASU PREOP CHECKLIST - NOTHING BY MOUTH SINCE
your prescription for eye drops to use three times a day until your appointment with an ophthalmologist or until your symptoms resolve. You have been provided the information for [facility]. If you prefer, you may also try [facility]. Be sure to wear sunglasses outside as this eyedrop will may you sensitive to light.   
07-Nov-2018 20:00

## 2024-07-23 ENCOUNTER — OFFICE VISIT (OUTPATIENT)
Dept: FAMILY MEDICINE CLINIC | Facility: CLINIC | Age: 59
End: 2024-07-23
Payer: COMMERCIAL

## 2024-07-23 VITALS
OXYGEN SATURATION: 98 % | BODY MASS INDEX: 41.74 KG/M2 | HEIGHT: 69 IN | TEMPERATURE: 96.1 F | WEIGHT: 281.8 LBS | HEART RATE: 78 BPM | SYSTOLIC BLOOD PRESSURE: 134 MMHG | DIASTOLIC BLOOD PRESSURE: 76 MMHG

## 2024-07-23 DIAGNOSIS — E66.01 CLASS 3 SEVERE OBESITY WITH BODY MASS INDEX (BMI) OF 40.0 TO 44.9 IN ADULT, UNSPECIFIED OBESITY TYPE, UNSPECIFIED WHETHER SERIOUS COMORBIDITY PRESENT (HCC): ICD-10-CM

## 2024-07-23 DIAGNOSIS — I10 PRIMARY HYPERTENSION: ICD-10-CM

## 2024-07-23 DIAGNOSIS — E78.2 DM TYPE 2 WITH DIABETIC MIXED HYPERLIPIDEMIA  (HCC): Primary | ICD-10-CM

## 2024-07-23 DIAGNOSIS — E11.69 DM TYPE 2 WITH DIABETIC MIXED HYPERLIPIDEMIA  (HCC): Primary | ICD-10-CM

## 2024-07-23 DIAGNOSIS — I47.10 SVT (SUPRAVENTRICULAR TACHYCARDIA): ICD-10-CM

## 2024-07-23 DIAGNOSIS — E11.9 TYPE 2 DIABETES, HBA1C GOAL < 7% (HCC): ICD-10-CM

## 2024-07-23 DIAGNOSIS — Z11.4 SCREENING FOR HIV (HUMAN IMMUNODEFICIENCY VIRUS): ICD-10-CM

## 2024-07-23 DIAGNOSIS — Z12.5 SCREENING FOR PROSTATE CANCER: ICD-10-CM

## 2024-07-23 DIAGNOSIS — Z12.11 SCREENING FOR COLON CANCER: ICD-10-CM

## 2024-07-23 DIAGNOSIS — Z11.59 NEED FOR HEPATITIS C SCREENING TEST: ICD-10-CM

## 2024-07-23 PROBLEM — E66.813 CLASS 3 SEVERE OBESITY WITH BODY MASS INDEX (BMI) OF 40.0 TO 44.9 IN ADULT (HCC): Status: ACTIVE | Noted: 2024-07-23

## 2024-07-23 PROCEDURE — 99204 OFFICE O/P NEW MOD 45 MIN: CPT | Performed by: INTERNAL MEDICINE

## 2024-07-23 RX ORDER — METOPROLOL SUCCINATE 25 MG/1
25 TABLET, EXTENDED RELEASE ORAL DAILY
COMMUNITY
Start: 2024-05-01

## 2024-07-23 RX ORDER — ATORVASTATIN CALCIUM 40 MG/1
40 TABLET, FILM COATED ORAL DAILY
COMMUNITY

## 2024-07-23 RX ORDER — VALSARTAN 80 MG/1
80 TABLET ORAL DAILY
COMMUNITY

## 2024-07-23 RX ORDER — PAROXETINE 30 MG/1
30 TABLET, FILM COATED ORAL DAILY
COMMUNITY
Start: 2024-05-06

## 2024-07-23 RX ORDER — SEMAGLUTIDE 1.34 MG/ML
1 INJECTION, SOLUTION SUBCUTANEOUS
COMMUNITY
End: 2024-07-23

## 2024-07-23 RX ORDER — SILDENAFIL 50 MG/1
50 TABLET, FILM COATED ORAL DAILY PRN
COMMUNITY
Start: 2024-03-25 | End: 2025-03-25

## 2024-07-23 NOTE — ASSESSMENT & PLAN NOTE
He has poor control.  His last A1c was greater than 8.  He describes sugars usually being in the 100s but after this illnes he had about 2 weeks ago  they have been coming down slowly.  He remains in the 200 range.  He would like to hold off on the addition of any new medication, but agrees to increase his Ozempic from 1 mg to 2 mg.  New prescription sent in today.  Will see how sugars are in a few weeks.  I will otherwise do labs before the 3-month appointment.  He should check his sugar twice a day.  Lab Results   Component Value Date    HGBA1C 7.8 (H) 02/06/2023

## 2024-07-23 NOTE — ASSESSMENT & PLAN NOTE
Remains on moderate intensity statin.  Last lipids are exceptional.  Lab Results   Component Value Date    HGBA1C 7.8 (H) 02/06/2023

## 2024-07-23 NOTE — ASSESSMENT & PLAN NOTE
Status post ablation.  Remains on low-dose metoprolol.  No recurrence.  Ablation surgery 4 years ago.

## 2024-07-23 NOTE — ASSESSMENT & PLAN NOTE
Hopefully the increase in Ozempic will do something for him.  Strongly encouraged to begin walking again which he used to do but does not anymore.  He is intolerant to metformin, not sure if he is try the XR version yet or not.  Consider this on next appointment.  Also might consider Farxiga or Jardiance.  States his insurance is good, and he pays nothing for the Ozempic right now.

## 2024-07-23 NOTE — PROGRESS NOTES
"Ambulatory Visit  Name: Idris Frankel      : 1965      MRN: [id number]  Encounter Provider: Peter Velazquez DO  Encounter Date: 2024   Encounter department: [facility] PRIMARY [facility]    Assessment & Plan   1. Need for hepatitis C screening test  2. Screening for HIV (human immunodeficiency virus)  3. Screening for colon cancer       History of Present Illness   {Disappearing Hyperlinks I Encounters * My Last Note * Since Last Visit * History :47642}  HPI    Review of Systems  {Select to Display PMH (Optional):35444}  Objective   {Disappearing Hyperlinks   Review Vitals * Enter New Vitals * Results Review * Labs * Imaging * Cardiology * Procedures * Lung Cancer Screening :01752}  /76 (BP Location: Left arm, Patient Position: Sitting, Cuff Size: Large)   Pulse 78   Temp (!) 96.1 °F (35.6 °C) (Tympanic)   Ht 5' 9\" (1.753 m)   Wt 128 kg (281 lb 12.8 oz)   SpO2 98%   BMI 41.61 kg/m²     Physical Exam  Administrative Statements {Disappearing Hyperlinks I  Level of Service * PCMH/PCSP:24623}  {Time Spent Statement (Optional):84087}        "

## 2024-07-23 NOTE — PROGRESS NOTES
Ambulatory Visit  Name: Idris Frankel      : 1965      MRN: [id number]  Encounter Provider: Peter Velazquez DO  Encounter Date: 2024   Encounter department: [facility] PRIMARY CARE    Assessment & Plan   1. DM type 2 with diabetic mixed hyperlipidemia  (HCC)  Assessment & Plan:  Remains on moderate intensity statin.  Last lipids are exceptional.  Lab Results   Component Value Date    HGBA1C 7.8 (H) 2023     Orders:  -     Hemoglobin A1C; Future; Expected date: 10/23/2024  -     Albumin / creatinine urine ratio; Future; Expected date: 10/23/2024  -     Lipid Panel with Direct LDL reflex; Future; Expected date: 10/23/2024  -     semaglutide, 2 mg/dose, (Ozempic) 8 mg/ mL injection pen; Inject 0.75 mL (2 mg total) under the skin every 7 days  2. Type 2 diabetes, HbA1c goal < 7% (HCC)  Assessment & Plan:  He has poor control.  His last A1c was greater than 8.  He describes sugars usually being in the 100s but after this illnes he had about 2 weeks ago  they have been coming down slowly.  He remains in the 200 range.  He would like to hold off on the addition of any new medication, but agrees to increase his Ozempic from 1 mg to 2 mg.  New prescription sent in today.  Will see how sugars are in a few weeks.  I will otherwise do labs before the 3-month appointment.  He should check his sugar twice a day.  Lab Results   Component Value Date    HGBA1C 7.8 (H) 2023     3. Class 3 severe obesity with body mass index (BMI) of 40.0 to 44.9 in adult, unspecified obesity type, unspecified whether serious comorbidity present (HCC)  Assessment & Plan:  Hopefully the increase in Ozempic will do something for him.  Strongly encouraged to begin walking again which he used to do but does not anymore.  He is intolerant to metformin, not sure if he is try the XR version yet or not.  Consider this on next appointment.  Also might consider Farxiga or Jardiance.  States his insurance is good, and he  pays nothing for the Ozempic right now.  4. Primary hypertension  Assessment & Plan:  Continue on valsartan at 80 mg.  Blood pressure well-controlled.  Orders:  -     Comprehensive metabolic panel; Future; Expected date: 10/23/2024  -     CBC and differential; Future; Expected date: 10/23/2024  5. SVT (supraventricular tachycardia)  Assessment & Plan:  Status post ablation.  Remains on low-dose metoprolol.  No recurrence.  Ablation surgery 4 years ago.  6. Need for hepatitis C screening test  -     Hepatitis C Antibody; Future  7. Screening for HIV (human immunodeficiency virus)  -     HIV 1/2 AG/AB w Reflex [facility] for 2 yr old and above; Future  8. Screening for colon cancer  -     Ambulatory Referral to Gastroenterology; Future  9. Screening for prostate cancer  -     PSA, Total Screen; Future; Expected date: 10/23/2024       History of Present Illness     58-year-old here to establish care.  Has been doing fairly well until recently, he had a GI illness with diarrhea and vomiting.  Since then his sugars been high.  His last A1c was above 8.  He is on Ozempic at 1 mg and no other medicines.  Metformin gave him diarrhea so he is intolerant to this.    He has no history of coronary artery disease, had a recent stress test.  He does have a history of SVT ablation surgery.    He denies any chest pain, shortness of breath, nausea or vomiting.  Tolerating his 1 mg of Ozempic for some time now.  Remains at a BMI of 41.  Reviewed his vaccines, he refuses to get a second shingles shot because of the illness he had following.  Also refusing further COVID shots.    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     He has never had a colonoscopy.  Never had a PSA done.  Monitors his sugar daily, and they are running in the 200 range        Review of Systems   Constitutional:  Negative for chills and fever.   HENT:  Negative for ear pain, rhinorrhea and sore throat.    Eyes:  Negative for pain, redness  "and visual disturbance.   Respiratory:  Negative for cough and shortness of breath.    Cardiovascular:  Negative for chest pain and leg swelling.   Gastrointestinal:  Negative for abdominal pain, diarrhea, nausea and vomiting.   Genitourinary:  Negative for dysuria, flank pain, frequency and urgency.   Musculoskeletal:  Positive for arthralgias and back pain. Negative for myalgias and neck pain.   Skin:  Negative for color change and rash.   Neurological:  Negative for dizziness, weakness, light-headedness and headaches.   Hematological: Negative.    Psychiatric/Behavioral:  Negative for agitation.    All other systems reviewed and are negative.      Objective     /76 (BP Location: Left arm, Patient Position: Sitting, Cuff Size: Large)   Pulse 78   Temp (!) 96.1 °F (35.6 °C) (Tympanic)   Ht 5' 9\" (1.753 m)   Wt 128 kg (281 lb 12.8 oz)   SpO2 98%   BMI 41.61 kg/m²     Physical Exam  Vitals and nursing note reviewed.   Constitutional:       General: He is not in acute distress.     Appearance: He is well-developed. He is obese.   HENT:      Head: Normocephalic and atraumatic.   Eyes:      Conjunctiva/sclera: Conjunctivae normal.   Cardiovascular:      Rate and Rhythm: Normal rate and regular rhythm.      Pulses: Normal pulses.      Heart sounds: Normal heart sounds. No murmur heard.  Pulmonary:      Effort: Pulmonary effort is normal. No respiratory distress.      Breath sounds: Normal breath sounds.   Abdominal:      Palpations: Abdomen is soft.      Tenderness: There is no abdominal tenderness.   Musculoskeletal:         General: No swelling.      Cervical back: Neck supple.   Skin:     General: Skin is warm and dry.      Capillary Refill: Capillary refill takes less than 2 seconds.   Neurological:      General: No focal deficit present.      Mental Status: He is alert and oriented to person, place, and time.   Psychiatric:         Mood and Affect: Mood normal.       Administrative Statements           "

## 2024-10-17 ENCOUNTER — RA CDI HCC (OUTPATIENT)
Dept: OTHER | Facility: HOSPITAL | Age: 59
End: 2024-10-17

## 2024-10-24 ENCOUNTER — APPOINTMENT (OUTPATIENT)
Age: 59
End: 2024-10-24
Payer: COMMERCIAL

## 2024-10-24 ENCOUNTER — OFFICE VISIT (OUTPATIENT)
Dept: FAMILY MEDICINE CLINIC | Facility: CLINIC | Age: 59
End: 2024-10-24
Payer: COMMERCIAL

## 2024-10-24 VITALS
HEIGHT: 69 IN | DIASTOLIC BLOOD PRESSURE: 64 MMHG | TEMPERATURE: 97.6 F | WEIGHT: 272 LBS | SYSTOLIC BLOOD PRESSURE: 138 MMHG | OXYGEN SATURATION: 98 % | HEART RATE: 86 BPM | BODY MASS INDEX: 40.29 KG/M2

## 2024-10-24 DIAGNOSIS — Z00.00 MEDICARE ANNUAL WELLNESS VISIT, SUBSEQUENT: Primary | ICD-10-CM

## 2024-10-24 DIAGNOSIS — N52.01 ERECTILE DYSFUNCTION DUE TO ARTERIAL INSUFFICIENCY: ICD-10-CM

## 2024-10-24 DIAGNOSIS — E11.69 DM TYPE 2 WITH DIABETIC MIXED HYPERLIPIDEMIA  (HCC): ICD-10-CM

## 2024-10-24 DIAGNOSIS — E78.2 DM TYPE 2 WITH DIABETIC MIXED HYPERLIPIDEMIA  (HCC): ICD-10-CM

## 2024-10-24 DIAGNOSIS — I10 PRIMARY HYPERTENSION: ICD-10-CM

## 2024-10-24 DIAGNOSIS — Z11.4 SCREENING FOR HIV (HUMAN IMMUNODEFICIENCY VIRUS): ICD-10-CM

## 2024-10-24 DIAGNOSIS — Z12.5 SCREENING FOR PROSTATE CANCER: ICD-10-CM

## 2024-10-24 DIAGNOSIS — H83.3X9 NOISE-INDUCED HEARING LOSS, UNSPECIFIED LATERALITY: ICD-10-CM

## 2024-10-24 DIAGNOSIS — Z23 ENCOUNTER FOR IMMUNIZATION: ICD-10-CM

## 2024-10-24 DIAGNOSIS — I47.10 SVT (SUPRAVENTRICULAR TACHYCARDIA) (HCC): ICD-10-CM

## 2024-10-24 DIAGNOSIS — F41.9 ANXIETY: ICD-10-CM

## 2024-10-24 DIAGNOSIS — Z12.11 SCREENING FOR COLON CANCER: ICD-10-CM

## 2024-10-24 DIAGNOSIS — E11.9 TYPE 2 DIABETES, HBA1C GOAL < 7% (HCC): ICD-10-CM

## 2024-10-24 DIAGNOSIS — Z11.59 NEED FOR HEPATITIS C SCREENING TEST: ICD-10-CM

## 2024-10-24 LAB
ALBUMIN SERPL BCG-MCNC: 4.4 G/DL (ref 3.5–5)
ALP SERPL-CCNC: 109 U/L (ref 34–104)
ALT SERPL W P-5'-P-CCNC: 17 U/L (ref 7–52)
ANION GAP SERPL CALCULATED.3IONS-SCNC: 10 MMOL/L (ref 4–13)
AST SERPL W P-5'-P-CCNC: 13 U/L (ref 13–39)
BASOPHILS # BLD AUTO: 0.06 THOUSANDS/ΜL (ref 0–0.1)
BASOPHILS NFR BLD AUTO: 1 % (ref 0–1)
BILIRUB SERPL-MCNC: 0.59 MG/DL (ref 0.2–1)
BUN SERPL-MCNC: 19 MG/DL (ref 5–25)
CALCIUM SERPL-MCNC: 9.6 MG/DL (ref 8.4–10.2)
CHLORIDE SERPL-SCNC: 98 MMOL/L (ref 96–108)
CHOLEST SERPL-MCNC: 178 MG/DL
CO2 SERPL-SCNC: 25 MMOL/L (ref 21–32)
CREAT SERPL-MCNC: 1.04 MG/DL (ref 0.6–1.3)
CREAT UR-MCNC: 147.5 MG/DL
EOSINOPHIL # BLD AUTO: 0.09 THOUSAND/ΜL (ref 0–0.61)
EOSINOPHIL NFR BLD AUTO: 1 % (ref 0–6)
ERYTHROCYTE [DISTWIDTH] IN BLOOD BY AUTOMATED COUNT: 12.4 % (ref 11.6–15.1)
EST. AVERAGE GLUCOSE BLD GHB EST-MCNC: 289 MG/DL
GFR SERPL CREATININE-BSD FRML MDRD: 78 ML/MIN/1.73SQ M
GLUCOSE P FAST SERPL-MCNC: 375 MG/DL (ref 65–99)
HBA1C MFR BLD: 11.7 %
HCT VFR BLD AUTO: 46.2 % (ref 36.5–49.3)
HDLC SERPL-MCNC: 38 MG/DL
HGB BLD-MCNC: 15.8 G/DL (ref 12–17)
IMM GRANULOCYTES # BLD AUTO: 0.04 THOUSAND/UL (ref 0–0.2)
IMM GRANULOCYTES NFR BLD AUTO: 1 % (ref 0–2)
LDLC SERPL CALC-MCNC: 114 MG/DL (ref 0–100)
LEFT EYE DIABETIC RETINOPATHY: NORMAL
LEFT EYE IMAGE QUALITY: NORMAL
LEFT EYE MACULAR EDEMA: NORMAL
LEFT EYE OTHER RETINOPATHY: NORMAL
LYMPHOCYTES # BLD AUTO: 1.67 THOUSANDS/ΜL (ref 0.6–4.47)
LYMPHOCYTES NFR BLD AUTO: 20 % (ref 14–44)
MCH RBC QN AUTO: 31.5 PG (ref 26.8–34.3)
MCHC RBC AUTO-ENTMCNC: 34.2 G/DL (ref 31.4–37.4)
MCV RBC AUTO: 92 FL (ref 82–98)
MICROALBUMIN UR-MCNC: 151.1 MG/L
MICROALBUMIN/CREAT 24H UR: 102 MG/G CREATININE (ref 0–30)
MONOCYTES # BLD AUTO: 0.64 THOUSAND/ΜL (ref 0.17–1.22)
MONOCYTES NFR BLD AUTO: 8 % (ref 4–12)
NEUTROPHILS # BLD AUTO: 5.98 THOUSANDS/ΜL (ref 1.85–7.62)
NEUTS SEG NFR BLD AUTO: 69 % (ref 43–75)
NRBC BLD AUTO-RTO: 0 /100 WBCS
PLATELET # BLD AUTO: 496 THOUSANDS/UL (ref 149–390)
PMV BLD AUTO: 10.1 FL (ref 8.9–12.7)
POTASSIUM SERPL-SCNC: 4.6 MMOL/L (ref 3.5–5.3)
PROT SERPL-MCNC: 7.8 G/DL (ref 6.4–8.4)
PSA SERPL-MCNC: 0.77 NG/ML (ref 0–4)
RBC # BLD AUTO: 5.02 MILLION/UL (ref 3.88–5.62)
RIGHT EYE DIABETIC RETINOPATHY: NORMAL
RIGHT EYE IMAGE QUALITY: NORMAL
RIGHT EYE MACULAR EDEMA: NORMAL
RIGHT EYE OTHER RETINOPATHY: NORMAL
SEVERITY (EYE EXAM): NORMAL
SODIUM SERPL-SCNC: 133 MMOL/L (ref 135–147)
TRIGL SERPL-MCNC: 130 MG/DL
WBC # BLD AUTO: 8.48 THOUSAND/UL (ref 4.31–10.16)

## 2024-10-24 PROCEDURE — 85025 COMPLETE CBC W/AUTO DIFF WBC: CPT

## 2024-10-24 PROCEDURE — 82570 ASSAY OF URINE CREATININE: CPT

## 2024-10-24 PROCEDURE — 99213 OFFICE O/P EST LOW 20 MIN: CPT | Performed by: INTERNAL MEDICINE

## 2024-10-24 PROCEDURE — 87389 HIV-1 AG W/HIV-1&-2 AB AG IA: CPT

## 2024-10-24 PROCEDURE — G2211 COMPLEX E/M VISIT ADD ON: HCPCS | Performed by: INTERNAL MEDICINE

## 2024-10-24 PROCEDURE — 80061 LIPID PANEL: CPT

## 2024-10-24 PROCEDURE — G0103 PSA SCREENING: HCPCS

## 2024-10-24 PROCEDURE — 86803 HEPATITIS C AB TEST: CPT

## 2024-10-24 PROCEDURE — 83036 HEMOGLOBIN GLYCOSYLATED A1C: CPT

## 2024-10-24 PROCEDURE — G0439 PPPS, SUBSEQ VISIT: HCPCS | Performed by: INTERNAL MEDICINE

## 2024-10-24 PROCEDURE — 82043 UR ALBUMIN QUANTITATIVE: CPT

## 2024-10-24 PROCEDURE — 80053 COMPREHEN METABOLIC PANEL: CPT

## 2024-10-24 PROCEDURE — G0009 ADMIN PNEUMOCOCCAL VACCINE: HCPCS | Performed by: INTERNAL MEDICINE

## 2024-10-24 PROCEDURE — 36415 COLL VENOUS BLD VENIPUNCTURE: CPT

## 2024-10-24 PROCEDURE — 90677 PCV20 VACCINE IM: CPT | Performed by: INTERNAL MEDICINE

## 2024-10-24 RX ORDER — PAROXETINE 40 MG/1
40 TABLET, FILM COATED ORAL EVERY MORNING
Qty: 90 TABLET | Refills: 3 | Status: SHIPPED | OUTPATIENT
Start: 2024-10-24

## 2024-10-24 RX ORDER — LANCETS 33 GAUGE
EACH MISCELLANEOUS
Qty: 100 EACH | Refills: 3 | Status: SHIPPED | OUTPATIENT
Start: 2024-10-24

## 2024-10-24 RX ORDER — VALSARTAN 80 MG/1
80 TABLET ORAL DAILY
Qty: 90 TABLET | Refills: 3 | Status: SHIPPED | OUTPATIENT
Start: 2024-10-24

## 2024-10-24 RX ORDER — BLOOD-GLUCOSE METER
KIT MISCELLANEOUS
Qty: 1 KIT | Refills: 0 | Status: SHIPPED | OUTPATIENT
Start: 2024-10-24

## 2024-10-24 RX ORDER — METOPROLOL SUCCINATE 25 MG/1
25 TABLET, EXTENDED RELEASE ORAL DAILY
Qty: 90 TABLET | Refills: 3 | Status: SHIPPED | OUTPATIENT
Start: 2024-10-24

## 2024-10-24 RX ORDER — SILDENAFIL 100 MG/1
100 TABLET, FILM COATED ORAL DAILY PRN
Qty: 10 TABLET | Refills: 5 | Status: SHIPPED | OUTPATIENT
Start: 2024-10-24

## 2024-10-24 RX ORDER — BLOOD SUGAR DIAGNOSTIC
STRIP MISCELLANEOUS
Qty: 100 EACH | Refills: 3 | Status: SHIPPED | OUTPATIENT
Start: 2024-10-24

## 2024-10-24 NOTE — ASSESSMENT & PLAN NOTE
Stable on current beta-blocker.  Orders:    metoprolol succinate (TOPROL-XL) 25 mg 24 hr tablet; Take 1 tablet (25 mg total) by mouth daily

## 2024-10-24 NOTE — ASSESSMENT & PLAN NOTE
Reviewed the AWV questionnaire.  Went through standard need for appropriate screening tests based on risk factors..  Reviewed cognitive issues.  Reviewed living will and DURABLE POWER OF .  Discussed healthy lifestyle, healthy diet.  Reviewed vaccines.  Encourage exercise.  Discussed safety issues within the home and about.      Agrees to Pneumovax today.

## 2024-10-24 NOTE — PATIENT INSTRUCTIONS
Medicare Preventive Visit Patient Instructions  Thank you for completing your Welcome to Medicare Visit or Medicare Annual Wellness Visit today. Your next wellness visit will be due in one year (10/25/2025).  The screening/preventive services that you may require over the next 5-10 years are detailed below. Some tests may not apply to you based off risk factors and/or age. Screening tests ordered at today's visit but not completed yet may show as past due. Also, please note that scanned in results may not display below.  Preventive Screenings:  Service Recommendations Previous Testing/Comments   Colorectal Cancer Screening  Colonoscopy    Fecal Occult Blood Test (FOBT)/Fecal Immunochemical Test (FIT)  Fecal DNA/Cologuard Test  Flexible Sigmoidoscopy Age: 45-75 years old   Colonoscopy: every 10 years (May be performed more frequently if at higher risk)  OR  FOBT/FIT: every 1 year  OR  Cologuard: every 3 years  OR  Sigmoidoscopy: every 5 years  Screening may be recommended earlier than age 45 if at higher risk for colorectal cancer. Also, an individualized decision between you and your healthcare provider will decide whether screening between the ages of 76-85 would be appropriate. Colonoscopy: Not on file  FOBT/FIT: Not on file  Cologuard: Not on file  Sigmoidoscopy: Not on file          Prostate Cancer Screening Individualized decision between patient and health care provider in men between ages of 55-69   Medicare will cover every 12 months beginning on the day after your 50th birthday PSA: No results in last 5 years           Hepatitis C Screening Once for adults born between 1945 and 1965  More frequently in patients at high risk for Hepatitis C Hep C Antibody: 10/24/2024        Diabetes Screening 1-2 times per year if you're at risk for diabetes or have pre-diabetes Fasting glucose: No results in last 5 years (No results in last 5 years)  A1C: 7.8 % (2/6/2023)  Screening Not Indicated  History Diabetes    Cholesterol Screening Once every 5 years if you don't have a lipid disorder. May order more often based on risk factors. Lipid panel: 10/24/2024  Screening Not Indicated  History Lipid Disorder      Other Preventive Screenings Covered by Medicare:  Abdominal Aortic Aneurysm (AAA) Screening: covered once if your at risk. You're considered to be at risk if you have a family history of AAA or a male between the age of 65-75 who smoking at least 100 cigarettes in your lifetime.  Lung Cancer Screening: covers low dose CT scan once per year if you meet all of the following conditions: (1) Age 55-77; (2) No signs or symptoms of lung cancer; (3) Current smoker or have quit smoking within the last 15 years; (4) You have a tobacco smoking history of at least 20 pack years (packs per day x number of years you smoked); (5) You get a written order from a healthcare provider.  Glaucoma Screening: covered annually if you're considered high risk: (1) You have diabetes OR (2) Family history of glaucoma OR (3)  aged 50 and older OR (4)  American aged 65 and older  Osteoporosis Screening: covered every 2 years if you meet one of the following conditions: (1) Have a vertebral abnormality; (2) On glucocorticoid therapy for more than 3 months; (3) Have primary hyperparathyroidism; (4) On osteoporosis medications and need to assess response to drug therapy.  HIV Screening: covered annually if you're between the age of 15-65. Also covered annually if you are younger than 15 and older than 65 with risk factors for HIV infection. For pregnant patients, it is covered up to 3 times per pregnancy.    Immunizations:  Immunization Recommendations   Influenza Vaccine Annual influenza vaccination during flu season is recommended for all persons aged >= 6 months who do not have contraindications   Pneumococcal Vaccine   * Pneumococcal conjugate vaccine = PCV13 (Prevnar 13), PCV15 (Vaxneuvance), PCV20 (Prevnar 20)  *  Pneumococcal polysaccharide vaccine = PPSV23 (Pneumovax) Adults 19-65 yo with certain risk factors or if 65+ yo  If never received any pneumonia vaccine: recommend Prevnar 20 (PCV20)  Give PCV20 if previously received 1 dose of PCV13 or PPSV23   Hepatitis B Vaccine 3 dose series if at intermediate or high risk (ex: diabetes, end stage renal disease, liver disease)   Respiratory syncytial virus (RSV) Vaccine - COVERED BY MEDICARE PART D  * RSVPreF3 (Arexvy) CDC recommends that adults 60 years of age and older may receive a single dose of RSV vaccine using shared clinical decision-making (SCDM)   Tetanus (Td) Vaccine - COST NOT COVERED BY MEDICARE PART B Following completion of primary series, a booster dose should be given every 10 years to maintain immunity against tetanus. Td may also be given as tetanus wound prophylaxis.   Tdap Vaccine - COST NOT COVERED BY MEDICARE PART B Recommended at least once for all adults. For pregnant patients, recommended with each pregnancy.   Shingles Vaccine (Shingrix) - COST NOT COVERED BY MEDICARE PART B  2 shot series recommended in those 19 years and older who have or will have weakened immune systems or those 50 years and older     Health Maintenance Due:      Topic Date Due   • Hepatitis C Screening  Never done   • HIV Screening  Never done   • Colorectal Cancer Screening  Never done     Immunizations Due:      Topic Date Due   • Pneumococcal Vaccine: Pediatrics (0 to 5 Years) and At-Risk Patients (6 to 64 Years) (1 of 2 - PCV) Never done   • Influenza Vaccine (1) Never done   • COVID-19 Vaccine (1 - 2023-24 season) Never done     Advance Directives   What are advance directives?  Advance directives are legal documents that state your wishes and plans for medical care. These plans are made ahead of time in case you lose your ability to make decisions for yourself. Advance directives can apply to any medical decision, such as the treatments you want, and if you want to donate  organs.   What are the types of advance directives?  There are many types of advance directives, and each state has rules about how to use them. You may choose a combination of any of the following:  Living will:  This is a written record of the treatment you want. You can also choose which treatments you do not want, which to limit, and which to stop at a certain time. This includes surgery, medicine, IV fluid, and tube feedings.   Durable power of  for healthcare (DPAHC):  This is a written record that states who you want to make healthcare choices for you when you are unable to make them for yourself. This person, called a proxy, is usually a family member or a friend. You may choose more than 1 proxy.  Do not resuscitate (DNR) order:  A DNR order is used in case your heart stops beating or you stop breathing. It is a request not to have certain forms of treatment, such as CPR. A DNR order may be included in other types of advance directives.  Medical directive:  This covers the care that you want if you are in a coma, near death, or unable to make decisions for yourself. You can list the treatments you want for each condition. Treatment may include pain medicine, surgery, blood transfusions, dialysis, IV or tube feedings, and a ventilator (breathing machine).  Values history:  This document has questions about your views, beliefs, and how you feel and think about life. This information can help others choose the care that you would choose.  Why are advance directives important?  An advance directive helps you control your care. Although spoken wishes may be used, it is better to have your wishes written down. Spoken wishes can be misunderstood, or not followed. Treatments may be given even if you do not want them. An advance directive may make it easier for your family to make difficult choices about your care.   Weight Management   Why it is important to manage your weight:  Being overweight increases  your risk of health conditions such as heart disease, high blood pressure, type 2 diabetes, and certain types of cancer. It can also increase your risk for osteoarthritis, sleep apnea, and other respiratory problems. Aim for a slow, steady weight loss. Even a small amount of weight loss can lower your risk of health problems.  How to lose weight safely:  A safe and healthy way to lose weight is to eat fewer calories and get regular exercise. You can lose up about 1 pound a week by decreasing the number of calories you eat by 500 calories each day.   Healthy meal plan for weight management:  A healthy meal plan includes a variety of foods, contains fewer calories, and helps you stay healthy. A healthy meal plan includes the following:  Eat whole-grain foods more often.  A healthy meal plan should contain fiber. Fiber is the part of grains, fruits, and vegetables that is not broken down by your body. Whole-grain foods are healthy and provide extra fiber in your diet. Some examples of whole-grain foods are whole-wheat breads and pastas, oatmeal, brown rice, and bulgur.  Eat a variety of vegetables every day.  Include dark, leafy greens such as spinach, kale, armida greens, and mustard greens. Eat yellow and orange vegetables such as carrots, sweet potatoes, and winter squash.   Eat a variety of fruits every day.  Choose fresh or canned fruit (canned in its own juice or light syrup) instead of juice. Fruit juice has very little or no fiber.  Eat low-fat dairy foods.  Drink fat-free (skim) milk or 1% milk. Eat fat-free yogurt and low-fat cottage cheese. Try low-fat cheeses such as mozzarella and other reduced-fat cheeses.  Choose meat and other protein foods that are low in fat.  Choose beans or other legumes such as split peas or lentils. Choose fish, skinless poultry (chicken or turkey), or lean cuts of red meat (beef or pork). Before you cook meat or poultry, cut off any visible fat.   Use less fat and oil.  Try  baking foods instead of frying them. Add less fat, such as margarine, sour cream, regular salad dressing and mayonnaise to foods. Eat fewer high-fat foods. Some examples of high-fat foods include french fries, doughnuts, ice cream, and cakes.  Eat fewer sweets.  Limit foods and drinks that are high in sugar. This includes candy, cookies, regular soda, and sweetened drinks.  Exercise:  Exercise at least 30 minutes per day on most days of the week. Some examples of exercise include walking, biking, dancing, and swimming. You can also fit in more physical activity by taking the stairs instead of the elevator or parking farther away from stores. Ask your healthcare provider about the best exercise plan for you.      © Copyright [employer] 2018 Information is for End User's use only and may not be sold, redistributed or otherwise used for commercial purposes. All illustrations and images included in CareNotes® are the copyrighted property of [employer].D.A[employer]M., [employer]. or [employer]

## 2024-10-24 NOTE — ASSESSMENT & PLAN NOTE
Begin Rybelsus.  Remains on moderate intensity statin.  Labs pending.    Lab Results   Component Value Date    HGBA1C 7.8 (H) 02/06/2023       Orders:    Hemoglobin A1C; Future    semaglutide (Rybelsus) 7 MG tablet; Take 1 tablet (7 mg total) by mouth daily Take on an empty stomach with 4 oz water, and wait 30 minutes before eating Do not start before November 23, 2024.

## 2024-10-24 NOTE — ASSESSMENT & PLAN NOTE
Intolerant to Ozempic at 2 mg dose, he would like to go on Rybelsus which is has been tolerated by some family members.  Rx sent on his behalf.    Lab Results   Component Value Date    HGBA1C 7.8 (H) 02/06/2023       Orders:    [facility] Diabetic eye exam    semaglutide (Rybelsus) 3 MG tablet; Take 1 tablet (3 mg total) by mouth daily Take on an empty stomach with 4 oz water, and wait 30 minutes before eating    Hemoglobin A1C; Future    semaglutide (Rybelsus) 7 MG tablet; Take 1 tablet (7 mg total) by mouth daily Take on an empty stomach with 4 oz water, and wait 30 minutes before eating Do not start before November 23, 2024.    Blood Glucose Monitoring Suppl (OneTouch Verio Reflect) w/Device KIT; Check blood sugars once daily. Please substitute with appropriate alternative as covered by patient's insurance. Dx: E11.65    glucose blood (OneTouch Verio) test strip; Check blood sugars once daily. Please substitute with appropriate alternative as covered by patient's insurance. Dx: E11.65    OneTouch Delica Lancets 33G MISC; Check blood sugars once daily. Please substitute with appropriate alternative as covered by patient's insurance. Dx: E11.65

## 2024-10-24 NOTE — PROGRESS NOTES
Ambulatory Visit  Name: Idris Frankel      : 1965      MRN: [id number]  Encounter Provider: Peter Velazquez DO  Encounter Date: 10/24/2024   Encounter department: [facility] PRIMARY CARE    Assessment & Plan  Medicare annual wellness visit, subsequent  Reviewed the AWV questionnaire.  Went through standard need for appropriate screening tests based on risk factors..  Reviewed cognitive issues.  Reviewed living will and DURABLE POWER OF .  Discussed healthy lifestyle, healthy diet.  Reviewed vaccines.  Encourage exercise.  Discussed safety issues within the home and about.      Agrees to Pneumovax today.  Type 2 diabetes, HbA1c goal < 7% (HCC)  Intolerant to Ozempic at 2 mg dose, he would like to go on Rybelsus which is has been tolerated by some family members.  Rx sent on his behalf.    Lab Results   Component Value Date    HGBA1C 7.8 (H) 2023       Orders:    IRIS Diabetic eye exam    semaglutide (Rybelsus) 3 MG tablet; Take 1 tablet (3 mg total) by mouth daily Take on an empty stomach with 4 oz water, and wait 30 minutes before eating    Hemoglobin A1C; Future    semaglutide (Rybelsus) 7 MG tablet; Take 1 tablet (7 mg total) by mouth daily Take on an empty stomach with 4 oz water, and wait 30 minutes before eating Do not start before 2024.    Blood Glucose Monitoring Suppl (OneTouch Verio Reflect) w/Device KIT; Check blood sugars once daily. Please substitute with appropriate alternative as covered by patient's insurance. Dx: E11.65    glucose blood (OneTouch Verio) test strip; Check blood sugars once daily. Please substitute with appropriate alternative as covered by patient's insurance. Dx: E11.65    OneTouch Delica Lancets 33G MISC; Check blood sugars once daily. Please substitute with appropriate alternative as covered by patient's insurance. Dx: E11.65    DM type 2 with diabetic mixed hyperlipidemia  (HCC)  Begin Rybelsus.  Remains on moderate intensity  statin.  Labs pending.    Lab Results   Component Value Date    HGBA1C 7.8 (H) 02/06/2023       Orders:    Hemoglobin A1C; Future    semaglutide (Rybelsus) 7 MG tablet; Take 1 tablet (7 mg total) by mouth daily Take on an empty stomach with 4 oz water, and wait 30 minutes before eating Do not start before November 23, 2024.    SVT (supraventricular tachycardia) (HCC)  Stable on current beta-blocker.  Orders:    metoprolol succinate (TOPROL-XL) 25 mg 24 hr tablet; Take 1 tablet (25 mg total) by mouth daily    Primary hypertension  Stable on current medical regimen.  Continue same.  Labs pending  Orders:    metoprolol succinate (TOPROL-XL) 25 mg 24 hr tablet; Take 1 tablet (25 mg total) by mouth daily    valsartan (DIOVAN) 80 mg tablet; Take 1 tablet (80 mg total) by mouth daily    Basic metabolic panel; Future    CBC and differential; Future    Anxiety  Increase Paxil from 30 mg to 40 mg.  Recheck in 4 months.  Orders:    PARoxetine (PAXIL) 40 MG tablet; Take 1 tablet (40 mg total) by mouth every morning    Encounter for immunization    Orders:    Pneumococcal Conjugate Vaccine 20-valent (Pcv20)    Screening for colon cancer  Has referral to GI already, just never followed through.       Erectile dysfunction due to arterial insufficiency    Orders:    sildenafil (VIAGRA) 100 mg tablet; Take 1 tablet (100 mg total) by mouth daily as needed for erectile dysfunction    Noise-induced hearing loss, unspecified laterality  Perceived loss of hearing, consider follow-up with ENT or audiology.  Declines today.         Depression Screening and Follow-up Plan: Patient was screened for depression during today's encounter. They screened negative with a PHQ-2 score of 2.      Preventive health issues were discussed with patient, and age appropriate screening tests were ordered as noted in patient's After Visit Summary. Personalized health advice and appropriate referrals for health education or preventive services given if needed,  as noted in patient's After Visit Summary.    History of Present Illness     Patient presents for their annual medical wellness visit.  Reviewed medical intake questionnaire.  Discussed living will and DURABLE POWER OF .  Discussed importance of these 2 documents.  Reviewed the various screening modalities the patient was due for.  Reviewed home safety as well as depression and risk of falling.  Through shared medical decision making move forward with testing or blood work as ordered.      The patient presents with no anxiety sugars high.  He stopped his Ozempic about a month ago due to nausea and vomiting but never called here.  He has no fever no chills no sweats.  Denies any chest pain to speak of.  He has had no change in bowel or bladder habits.  He notes increasing anxiety of late due to his nephew who is at [facility] now with brain cancer..     Has not been testing his blood sugars, as he is out of supplies and needs to get his glucometer renewed.  Reluctant to get the flu shot today.  Agrees to pneumonia shot.  Discussed all vaccines.  Also feels his ears are blocked.       Patient Care Team:  Peter Velazquez,  as PCP - General (Internal Medicine)    Review of Systems   Constitutional:  Negative for chills and fever.   HENT:  Negative for congestion.    Eyes:  Negative for pain and visual disturbance.   Respiratory:  Negative for cough and shortness of breath.    Cardiovascular:  Negative for chest pain and palpitations.   Gastrointestinal:  Negative for abdominal pain and vomiting.   Genitourinary:  Negative for dysuria and hematuria.   Musculoskeletal:  Positive for arthralgias and back pain.   Skin:  Negative for color change and rash.   Neurological:  Negative for seizures and syncope.   All other systems reviewed and are negative.    Medical History Reviewed by provider this encounter:       Annual Wellness Visit Questionnaire   Idris is here for his Subsequent Wellness visit.     Health Risk  Assessment:   Patient rates overall health as good. Patient feels that their physical health rating is same. Patient is very satisfied with their life. Eyesight was rated as slightly worse. Hearing was rated as slightly worse. Patient feels that their emotional and mental health rating is slightly worse. Patients states they are never, rarely angry. Patient states they are sometimes unusually tired/fatigued. Pain experienced in the last 7 days has been a lot. Patient's pain rating has been 8/10. Patient states that he has experienced weight loss or gain in last 6 months.     Depression Screening:   PHQ-2 Score: 2      Fall Risk Screening:   In the past year, patient has experienced: history of falling in past year    Number of falls: 2 or more  Injured during fall?: Yes    Feels unsteady when standing or walking?: Yes    Worried about falling?: Yes      Home Safety:  Patient has trouble with stairs inside or outside of their home. Patient has working smoke alarms and has no working carbon monoxide detector. Home safety hazards include: none.     Nutrition:   Current diet is Diabetic.     Medications:   Patient is not currently taking any over-the-counter supplements. Patient is able to manage medications.     Activities of Daily Living (ADLs)/Instrumental Activities of Daily Living (IADLs):   Walk and transfer into and out of bed and chair?: Yes  Dress and groom yourself?: Yes    Bathe or shower yourself?: Yes    Feed yourself? Yes  Do your laundry/housekeeping?: Yes  Manage your money, pay your bills and track your expenses?: Yes  Make your own meals?: Yes    Do your own shopping?: Yes    Previous Hospitalizations:   Any hospitalizations or ED visits within the last 12 months?: No      Advance Care Planning:   Living will: Yes    Advanced directive: Yes      PREVENTIVE SCREENINGS      Cardiovascular Screening:    General: Screening Not Indicated and History Lipid Disorder      Diabetes Screening:     General:  "Screening Not Indicated and History Diabetes      Lung Cancer Screening:     General: Screening Not Indicated    Screening, Brief Intervention, and Referral to Treatment (SBIRT)    Screening  Typical number of drinks in a day: 2    Single Item Drug Screening:  How often have you used an illegal drug (including marijuana) or a prescription medication for non-medical reasons in the past year? never    Single Item Drug Screen Score: 0  Interpretation: Negative screen for possible drug use disorder    SDOH Risk Assessment  Social determinants of health (SDOH) risk assesment tool was completed. The tool at a minimum covered housing stability, food insecurity, transportation needs, and utility difficulty. Patient had at risk responses for the following SDOH domains: housing stability.     Social Determinants of Health     Food Insecurity: Patient Declined (10/24/2024)    Hunger Vital Sign     Worried About Running Out of Food in the Last Year: Patient declined     Ran Out of Food in the Last Year: Patient declined   Transportation Needs: No Transportation Needs (10/24/2024)    PRAPARE - Transportation     Lack of Transportation (Medical): No     Lack of Transportation (Non-Medical): No   Housing Stability: High Risk (10/24/2024)    Housing Stability Vital Sign     Unable to Pay for Housing in the Last Year: Patient declined     Number of Times Moved in the Last Year: 2     Homeless in the Last Year: No   Utilities: Patient Declined (10/24/2024)    [facility] Utilities     Threatened with loss of utilities: Patient declined     No results found.    Objective     /64 (BP Location: Left arm, Patient Position: Sitting, Cuff Size: Large)   Pulse 86   Temp 97.6 °F (36.4 °C) (Tympanic)   Ht 5' 9\" (1.753 m)   Wt 123 kg (272 lb)   SpO2 98%   BMI 40.17 kg/m²     Physical Exam  Vitals and nursing note reviewed.   Constitutional:       General: He is not in acute distress.     Appearance: He is well-developed. He is obese. "   HENT:      Head: Normocephalic and atraumatic.      Right Ear: Tympanic membrane, ear canal and external ear normal.      Left Ear: Tympanic membrane, ear canal and external ear normal.   Eyes:      Conjunctiva/sclera: Conjunctivae normal.   Cardiovascular:      Rate and Rhythm: Normal rate and regular rhythm.      Pulses: Normal pulses. no weak pulses.           Dorsalis pedis pulses are 2+ on the right side and 2+ on the left side.        Posterior tibial pulses are 2+ on the right side and 2+ on the left side.      Heart sounds: No murmur heard.  Pulmonary:      Effort: Pulmonary effort is normal. No respiratory distress.      Breath sounds: Normal breath sounds.   Abdominal:      Palpations: Abdomen is soft.      Tenderness: There is no abdominal tenderness.   Musculoskeletal:         General: No swelling.      Cervical back: Neck supple.   Feet:      Right foot:      Skin integrity: No ulcer, skin breakdown, erythema, warmth, callus or dry skin.      Left foot:      Skin integrity: No ulcer, skin breakdown, erythema, warmth, callus or dry skin.   Skin:     General: Skin is warm and dry.      Capillary Refill: Capillary refill takes less than 2 seconds.   Neurological:      General: No focal deficit present.      Mental Status: He is alert and oriented to person, place, and time.   Psychiatric:         Mood and Affect: Mood normal.     Diabetic Foot Exam    Patient's shoes and socks removed.    Right Foot/Ankle   Right Foot Inspection  Skin Exam: skin normal and skin intact. No dry skin, no warmth, no callus, no erythema, no maceration, no abnormal color, no pre-ulcer, no ulcer and no callus.     Toe Exam: ROM and strength within normal limits.     Sensory   Monofilament testing: intact    Vascular  Capillary refills: < 3 seconds  The right DP pulse is 2+. The right PT pulse is 2+.     Left Foot/Ankle  Left Foot Inspection  Skin Exam: skin normal and skin intact. No dry skin, no warmth, no erythema, no  maceration, normal color, no pre-ulcer, no ulcer and no callus.     Toe Exam: ROM and strength within normal limits.     Sensory   Monofilament testing: intact    Vascular  Capillary refills: < 3 seconds  The left DP pulse is 2+. The left PT pulse is 2+.     Assign Risk Category  No deformity present  No loss of protective sensation  No weak pulses  Risk: 0

## 2024-10-24 NOTE — ASSESSMENT & PLAN NOTE
Stable on current medical regimen.  Continue same.  Labs pending  Orders:    metoprolol succinate (TOPROL-XL) 25 mg 24 hr tablet; Take 1 tablet (25 mg total) by mouth daily    valsartan (DIOVAN) 80 mg tablet; Take 1 tablet (80 mg total) by mouth daily    Basic metabolic panel; Future    CBC and differential; Future

## 2024-10-25 ENCOUNTER — TELEPHONE (OUTPATIENT)
Dept: FAMILY MEDICINE CLINIC | Facility: CLINIC | Age: 59
End: 2024-10-25

## 2024-10-25 LAB
HCV AB SER QL: NORMAL
HIV 1+2 AB+HIV1 P24 AG SERPL QL IA: NORMAL
HIV 2 AB SERPL QL IA: NORMAL
HIV1 AB SERPL QL IA: NORMAL
HIV1 P24 AG SERPL QL IA: NORMAL

## 2024-10-25 NOTE — TELEPHONE ENCOUNTER
Called patient and left message to call the office for Dr Velazquez's message in regards to his blood work.

## 2024-10-25 NOTE — TELEPHONE ENCOUNTER
----- Message from Peter Velazquez DO sent at 10/25/2024 12:02 PM EDT -----  Notify patient, sugars are quite high as predicted.  Begin his Rybelsus, and we will see where sugars land.   His cholesterol is not optimal, would like to increase his cholesterol pill Lipitor from 40 to 80 mg.  If he is agreeable to this I will send it in for him next week, he can double up what he has now.  Strongly encouraged him dietary restriction with regards to his blood sugars well  Report back to me in 1 month with twice daily blood sugars, may need to add a second pill other than is Rybelsus.

## 2024-11-01 NOTE — TELEPHONE ENCOUNTER
Letter sent to patient that we are unable to contact him to relay Dr Velazquez's message on starting medication

## 2024-11-18 NOTE — BRIEF OPERATIVE NOTE - POST-OP DX
MSW reviewed chart. [employer] message not yet read.     MSW attempted to reach patient/son to see if they had received/completed/returned the [employer] application. No answer at [phone]. MSW left a message requesting callback. Awaiting same.    MSW will place a copy of the Unable to Reach letter in the mail to patient's home address this date.     MSW will be available should patient callback/return [employer] message.    Carpal tunnel syndrome of left wrist  11/08/2018    Active  Mariela Barber

## 2024-11-23 PROBLEM — Z00.00 MEDICARE ANNUAL WELLNESS VISIT, SUBSEQUENT: Status: RESOLVED | Noted: 2024-10-24 | Resolved: 2024-11-23

## 2024-12-24 ENCOUNTER — TELEPHONE (OUTPATIENT)
Dept: FAMILY MEDICINE CLINIC | Facility: CLINIC | Age: 59
End: 2024-12-24

## 2024-12-24 NOTE — TELEPHONE ENCOUNTER
Colonoscopy referral done 07/23/2024.  Left message to schedule test and gave [facility] phone number.

## 2024-12-30 DIAGNOSIS — E11.9 TYPE 2 DIABETES, HBA1C GOAL < 7% (HCC): ICD-10-CM

## 2024-12-31 ENCOUNTER — TELEMEDICINE (OUTPATIENT)
Dept: OTHER | Facility: HOSPITAL | Age: 59
End: 2024-12-31
Payer: COMMERCIAL

## 2024-12-31 VITALS
HEIGHT: 72 IN | WEIGHT: 251 LBS | SYSTOLIC BLOOD PRESSURE: 119 MMHG | HEART RATE: 82 BPM | DIASTOLIC BLOOD PRESSURE: 70 MMHG | BODY MASS INDEX: 34 KG/M2

## 2024-12-31 DIAGNOSIS — K08.9 POOR DENTITION: ICD-10-CM

## 2024-12-31 DIAGNOSIS — K04.7 DENTAL INFECTION: Primary | ICD-10-CM

## 2024-12-31 PROBLEM — E78.5 HYPERLIPIDEMIA: Status: ACTIVE | Noted: 2023-02-06

## 2024-12-31 PROBLEM — F41.9 ANXIETY AND DEPRESSION: Status: ACTIVE | Noted: 2023-02-06

## 2024-12-31 PROBLEM — F32.A ANXIETY AND DEPRESSION: Status: ACTIVE | Noted: 2023-02-06

## 2024-12-31 PROBLEM — Z85.828 HISTORY OF SKIN CANCER: Status: ACTIVE | Noted: 2023-02-06

## 2024-12-31 PROCEDURE — 99213 OFFICE O/P EST LOW 20 MIN: CPT | Performed by: PHYSICIAN ASSISTANT

## 2024-12-31 RX ORDER — ORAL SEMAGLUTIDE 3 MG/1
TABLET ORAL
Qty: 30 TABLET | Refills: 5 | Status: SHIPPED | OUTPATIENT
Start: 2024-12-31

## 2024-12-31 RX ORDER — AMOXICILLIN 500 MG/1
500 TABLET, FILM COATED ORAL 3 TIMES DAILY
Qty: 30 TABLET | Refills: 0 | Status: SHIPPED | OUTPATIENT
Start: 2024-12-31 | End: 2025-01-10

## 2024-12-31 NOTE — PATIENT INSTRUCTIONS
Please make a follow-up appointment with a dentist as soon as possible. Info for our dental clinics can be found using the link below. Rinse your mouth with antiseptic such as Listerine after eating or smoking. You can take Tylenol as needed for pain up to 3,000 mg/24 hours and ibuprofen 600 mg can be alternated with the tylenol for more constant pain relief. Go to the emergency department for fevers greater than 100.3° F, significant facial swelling, or anything else that is concerning.    [email or web address]

## 2024-12-31 NOTE — PROGRESS NOTES
Virtual Regular Visit  Name: Idris Frankel      : 1965      MRN: [id number]  Encounter Provider: Your Video Visit Provider  Encounter Date: 2024   Encounter department: VIRTUAL CARE       Verification of patient location:  Patient is located at Home in the following state in which I hold an active license [place] :  Assessment & Plan  Dental infection    Orders:    amoxicillin (AMOXIL) 500 MG tablet; Take 1 tablet (500 mg total) by mouth 3 (three) times a day for 10 days    Ambulatory Referral to Dentistry; Future    Poor dentition    Orders:    Ambulatory Referral to Dentistry; Future        Encounter provider Your Video Visit Provider    The patient was identified by name and date of birth. Idris Frankel was informed that this is a telemedicine visit and that the visit is being conducted through the Epic Embedded platform. He agrees to proceed..  My office door was closed. No one else was in the room.  He acknowledged consent and understanding of privacy and security of the video platform. The patient has agreed to participate and understands they can discontinue the visit at any time.    Patient is aware this is a billable service.     History was obtained from: History obtained from: patient  History of Present Illness     Notes blood sugar has been high (as high as 458), but hasn't been taking his medications as directed. Pt reports a dental infection since yesterday. L front upper tooth is painful and swollen. Tried peroxide and mouth wash. Doesn't have dentist. Last dental exam > 1 year ago.       Review of Systems   Constitutional:  Negative for fever.   HENT:  Positive for dental problem. Negative for nosebleeds.    Eyes:  Negative for redness.   Respiratory:  Negative for shortness of breath.    Cardiovascular:  Negative for chest pain.   Gastrointestinal:  Negative for blood in stool.   Genitourinary:  Negative for hematuria.   Musculoskeletal:  Negative for gait problem.   Skin:  Negative for  rash.   Neurological:  Negative for seizures.   Psychiatric/Behavioral:  Negative for behavioral problems.        Objective   There were no vitals taken for this visit.    Physical Exam  Constitutional:       General: He is not in acute distress.     Appearance: Normal appearance. He is not toxic-appearing.   HENT:      Head: Normocephalic and atraumatic.      Nose: No rhinorrhea.      Mouth/Throat:      Lips: Pink.      Mouth: Mucous membranes are moist.     Eyes:      Conjunctiva/sclera: Conjunctivae normal.   Pulmonary:      Effort: Pulmonary effort is normal. No respiratory distress.      Breath sounds: No wheezing (no gross audible wheeze through computer).   Musculoskeletal:      Cervical back: Normal range of motion.   Skin:     Findings: No rash (on face or neck).   Neurological:      Mental Status: He is alert.      Cranial Nerves: No dysarthria or facial asymmetry.   Psychiatric:         Mood and Affect: Mood normal.         Behavior: Behavior normal.         Visit Time  Total Visit Duration: 8 minutes not including the time spent for establishing the audio/video connection.

## 2025-01-05 ENCOUNTER — APPOINTMENT (EMERGENCY)
Dept: CT IMAGING | Facility: HOSPITAL | Age: 60
End: 2025-01-05
Payer: COMMERCIAL

## 2025-01-05 ENCOUNTER — HOSPITAL ENCOUNTER (EMERGENCY)
Facility: HOSPITAL | Age: 60
Discharge: HOME | End: 2025-01-06
Attending: EMERGENCY MEDICINE
Payer: COMMERCIAL

## 2025-01-05 ENCOUNTER — APPOINTMENT (EMERGENCY)
Dept: RADIOLOGY | Facility: HOSPITAL | Age: 60
End: 2025-01-05
Payer: COMMERCIAL

## 2025-01-05 DIAGNOSIS — L03.211 FACIAL CELLULITIS: ICD-10-CM

## 2025-01-05 DIAGNOSIS — K04.7 DENTAL ABSCESS: Primary | ICD-10-CM

## 2025-01-05 LAB
ALBUMIN SERPL BCG-MCNC: 4.5 G/DL (ref 3.5–5)
ALP SERPL-CCNC: 116 U/L (ref 34–104)
ALT SERPL W P-5'-P-CCNC: 10 U/L (ref 7–52)
ANION GAP SERPL CALCULATED.3IONS-SCNC: 15 MMOL/L (ref 4–13)
APTT PPP: 26 SECONDS (ref 23–34)
AST SERPL W P-5'-P-CCNC: 7 U/L (ref 13–39)
B-OH-BUTYR SERPL-MCNC: 2.87 MMOL/L (ref 0.02–0.27)
BASE EX.OXY STD BLDV CALC-SCNC: 92.2 % (ref 60–80)
BASE EXCESS BLDV CALC-SCNC: -4.7 MMOL/L
BASOPHILS # BLD AUTO: 0.06 THOUSANDS/ΜL (ref 0–0.1)
BASOPHILS NFR BLD AUTO: 0 % (ref 0–1)
BILIRUB SERPL-MCNC: 0.71 MG/DL (ref 0.2–1)
BILIRUB UR QL STRIP: NEGATIVE
BUN SERPL-MCNC: 10 MG/DL (ref 5–25)
CALCIUM SERPL-MCNC: 9.7 MG/DL (ref 8.4–10.2)
CARDIAC TROPONIN I PNL SERPL HS: 7 NG/L (ref ?–50)
CHLORIDE SERPL-SCNC: 97 MMOL/L (ref 96–108)
CLARITY UR: CLEAR
CO2 SERPL-SCNC: 18 MMOL/L (ref 21–32)
COLOR UR: YELLOW
CREAT SERPL-MCNC: 0.94 MG/DL (ref 0.6–1.3)
EOSINOPHIL # BLD AUTO: 0.09 THOUSAND/ΜL (ref 0–0.61)
EOSINOPHIL NFR BLD AUTO: 1 % (ref 0–6)
ERYTHROCYTE [DISTWIDTH] IN BLOOD BY AUTOMATED COUNT: 12.4 % (ref 11.6–15.1)
FLUAV RNA RESP QL NAA+PROBE: NEGATIVE
FLUBV RNA RESP QL NAA+PROBE: NEGATIVE
GFR SERPL CREATININE-BSD FRML MDRD: 88 ML/MIN/1.73SQ M
GLUCOSE SERPL-MCNC: 286 MG/DL (ref 65–140)
GLUCOSE SERPL-MCNC: 453 MG/DL (ref 65–140)
GLUCOSE UR STRIP-MCNC: ABNORMAL MG/DL
HCO3 BLDV-SCNC: 17.1 MMOL/L (ref 24–30)
HCT VFR BLD AUTO: 45.7 % (ref 36.5–49.3)
HGB BLD-MCNC: 16 G/DL (ref 12–17)
HGB UR QL STRIP.AUTO: NEGATIVE
IMM GRANULOCYTES # BLD AUTO: 0.07 THOUSAND/UL (ref 0–0.2)
IMM GRANULOCYTES NFR BLD AUTO: 1 % (ref 0–2)
INR PPP: 1.02 (ref 0.85–1.19)
KETONES UR STRIP-MCNC: ABNORMAL MG/DL
LACTATE SERPL-SCNC: 1.2 MMOL/L (ref 0.5–2)
LEUKOCYTE ESTERASE UR QL STRIP: NEGATIVE
LYMPHOCYTES # BLD AUTO: 2.54 THOUSANDS/ΜL (ref 0.6–4.47)
LYMPHOCYTES NFR BLD AUTO: 17 % (ref 14–44)
MCH RBC QN AUTO: 30.9 PG (ref 26.8–34.3)
MCHC RBC AUTO-ENTMCNC: 35 G/DL (ref 31.4–37.4)
MCV RBC AUTO: 88 FL (ref 82–98)
MONOCYTES # BLD AUTO: 1.54 THOUSAND/ΜL (ref 0.17–1.22)
MONOCYTES NFR BLD AUTO: 10 % (ref 4–12)
NEUTROPHILS # BLD AUTO: 10.62 THOUSANDS/ΜL (ref 1.85–7.62)
NEUTS SEG NFR BLD AUTO: 71 % (ref 43–75)
NITRITE UR QL STRIP: NEGATIVE
NRBC BLD AUTO-RTO: 0 /100 WBCS
O2 CT BLDV-SCNC: 21.6 ML/DL
PCO2 BLDV: 25.2 MM HG (ref 42–50)
PH BLDV: 7.45 [PH] (ref 7.3–7.4)
PH UR STRIP.AUTO: 5.5 [PH]
PLATELET # BLD AUTO: 499 THOUSANDS/UL (ref 149–390)
PMV BLD AUTO: 8.9 FL (ref 8.9–12.7)
PO2 BLDV: 64.8 MM HG (ref 35–45)
POTASSIUM SERPL-SCNC: 3.7 MMOL/L (ref 3.5–5.3)
PROCALCITONIN SERPL-MCNC: 0.07 NG/ML
PROT SERPL-MCNC: 7.9 G/DL (ref 6.4–8.4)
PROT UR STRIP-MCNC: NEGATIVE MG/DL
PROTHROMBIN TIME: 13.9 SECONDS (ref 12.3–15)
RBC # BLD AUTO: 5.18 MILLION/UL (ref 3.88–5.62)
RSV RNA RESP QL NAA+PROBE: NEGATIVE
SARS-COV-2 RNA RESP QL NAA+PROBE: NEGATIVE
SODIUM SERPL-SCNC: 130 MMOL/L (ref 135–147)
SP GR UR STRIP.AUTO: 1.01
TSH SERPL DL<=0.05 MIU/L-ACNC: 2.47 UIU/ML (ref 0.45–4.5)
UROBILINOGEN UR QL STRIP.AUTO: 0.2 E.U./DL
WBC # BLD AUTO: 14.92 THOUSAND/UL (ref 4.31–10.16)

## 2025-01-05 PROCEDURE — 96361 HYDRATE IV INFUSION ADD-ON: CPT

## 2025-01-05 PROCEDURE — 84484 ASSAY OF TROPONIN QUANT: CPT | Performed by: EMERGENCY MEDICINE

## 2025-01-05 PROCEDURE — 70487 CT MAXILLOFACIAL W/DYE: CPT

## 2025-01-05 PROCEDURE — 99284 EMERGENCY DEPT VISIT MOD MDM: CPT

## 2025-01-05 PROCEDURE — 81003 URINALYSIS AUTO W/O SCOPE: CPT | Performed by: EMERGENCY MEDICINE

## 2025-01-05 PROCEDURE — 82805 BLOOD GASES W/O2 SATURATION: CPT | Performed by: EMERGENCY MEDICINE

## 2025-01-05 PROCEDURE — 84145 PROCALCITONIN (PCT): CPT | Performed by: EMERGENCY MEDICINE

## 2025-01-05 PROCEDURE — 84443 ASSAY THYROID STIM HORMONE: CPT | Performed by: EMERGENCY MEDICINE

## 2025-01-05 PROCEDURE — 80053 COMPREHEN METABOLIC PANEL: CPT | Performed by: EMERGENCY MEDICINE

## 2025-01-05 PROCEDURE — 71045 X-RAY EXAM CHEST 1 VIEW: CPT

## 2025-01-05 PROCEDURE — 82010 KETONE BODYS QUAN: CPT | Performed by: EMERGENCY MEDICINE

## 2025-01-05 PROCEDURE — 83605 ASSAY OF LACTIC ACID: CPT | Performed by: EMERGENCY MEDICINE

## 2025-01-05 PROCEDURE — 0241U HB NFCT DS VIR RESP RNA 4 TRGT: CPT | Performed by: EMERGENCY MEDICINE

## 2025-01-05 PROCEDURE — 96367 TX/PROPH/DG ADDL SEQ IV INF: CPT

## 2025-01-05 PROCEDURE — 85610 PROTHROMBIN TIME: CPT | Performed by: EMERGENCY MEDICINE

## 2025-01-05 PROCEDURE — 36415 COLL VENOUS BLD VENIPUNCTURE: CPT | Performed by: EMERGENCY MEDICINE

## 2025-01-05 PROCEDURE — 87040 BLOOD CULTURE FOR BACTERIA: CPT | Performed by: EMERGENCY MEDICINE

## 2025-01-05 PROCEDURE — 93005 ELECTROCARDIOGRAM TRACING: CPT

## 2025-01-05 PROCEDURE — 96365 THER/PROPH/DIAG IV INF INIT: CPT

## 2025-01-05 PROCEDURE — 82948 REAGENT STRIP/BLOOD GLUCOSE: CPT

## 2025-01-05 PROCEDURE — 85025 COMPLETE CBC W/AUTO DIFF WBC: CPT | Performed by: EMERGENCY MEDICINE

## 2025-01-05 PROCEDURE — 96375 TX/PRO/DX INJ NEW DRUG ADDON: CPT

## 2025-01-05 PROCEDURE — 99285 EMERGENCY DEPT VISIT HI MDM: CPT | Performed by: EMERGENCY MEDICINE

## 2025-01-05 PROCEDURE — 85730 THROMBOPLASTIN TIME PARTIAL: CPT | Performed by: EMERGENCY MEDICINE

## 2025-01-05 RX ORDER — HYDROMORPHONE HCL IN WATER/PF 6 MG/30 ML
0.2 PATIENT CONTROLLED ANALGESIA SYRINGE INTRAVENOUS ONCE
Refills: 0 | Status: COMPLETED | OUTPATIENT
Start: 2025-01-05 | End: 2025-01-05

## 2025-01-05 RX ORDER — KETOROLAC TROMETHAMINE 30 MG/ML
15 INJECTION, SOLUTION INTRAMUSCULAR; INTRAVENOUS ONCE
Status: COMPLETED | OUTPATIENT
Start: 2025-01-05 | End: 2025-01-05

## 2025-01-05 RX ORDER — SODIUM CHLORIDE 9 MG/ML
3 INJECTION INTRAVENOUS
Status: DISCONTINUED | OUTPATIENT
Start: 2025-01-05 | End: 2025-01-06 | Stop reason: HOSPADM

## 2025-01-05 RX ORDER — ACETAMINOPHEN 10 MG/ML
1000 INJECTION, SOLUTION INTRAVENOUS ONCE
Status: COMPLETED | OUTPATIENT
Start: 2025-01-05 | End: 2025-01-05

## 2025-01-05 RX ADMIN — SODIUM CHLORIDE 1000 ML: 0.9 INJECTION, SOLUTION INTRAVENOUS at 21:47

## 2025-01-05 RX ADMIN — AMPICILLIN SODIUM AND SULBACTAM SODIUM 3 G: 2; 1 INJECTION, POWDER, FOR SOLUTION INTRAMUSCULAR; INTRAVENOUS at 22:51

## 2025-01-05 RX ADMIN — KETOROLAC TROMETHAMINE 15 MG: 30 INJECTION, SOLUTION INTRAMUSCULAR at 23:27

## 2025-01-05 RX ADMIN — SODIUM CHLORIDE 1000 ML: 0.9 INJECTION, SOLUTION INTRAVENOUS at 21:46

## 2025-01-05 RX ADMIN — ACETAMINOPHEN 1000 MG: 1000 INJECTION, SOLUTION INTRAVENOUS at 23:27

## 2025-01-05 RX ADMIN — HYDROMORPHONE HYDROCHLORIDE 0.2 MG: 0.2 INJECTION, SOLUTION INTRAMUSCULAR; INTRAVENOUS; SUBCUTANEOUS at 21:50

## 2025-01-05 RX ADMIN — SODIUM CHLORIDE 1000 ML: 0.9 INJECTION, SOLUTION INTRAVENOUS at 22:50

## 2025-01-05 RX ADMIN — IOHEXOL 85 ML: 350 INJECTION, SOLUTION INTRAVENOUS at 22:27

## 2025-01-06 VITALS
RESPIRATION RATE: 20 BRPM | DIASTOLIC BLOOD PRESSURE: 73 MMHG | OXYGEN SATURATION: 95 % | HEART RATE: 107 BPM | SYSTOLIC BLOOD PRESSURE: 144 MMHG | TEMPERATURE: 97.6 F

## 2025-01-06 LAB
2HR DELTA HS TROPONIN: -1 NG/L
ATRIAL RATE: 122 BPM
ATRIAL RATE: 124 BPM
ATRIAL RATE: 124 BPM
CARDIAC TROPONIN I PNL SERPL HS: 6 NG/L (ref ?–50)
P AXIS: 34 DEGREES
P AXIS: 40 DEGREES
P AXIS: 47 DEGREES
PR INTERVAL: 140 MS
PR INTERVAL: 140 MS
PR INTERVAL: 146 MS
QRS AXIS: 124 DEGREES
QRS AXIS: 125 DEGREES
QRS AXIS: 127 DEGREES
QRSD INTERVAL: 132 MS
QRSD INTERVAL: 134 MS
QRSD INTERVAL: 136 MS
QT INTERVAL: 336 MS
QT INTERVAL: 338 MS
QT INTERVAL: 340 MS
QTC INTERVAL: 482 MS
QTC INTERVAL: 484 MS
QTC INTERVAL: 485 MS
T WAVE AXIS: 10 DEGREES
T WAVE AXIS: 3 DEGREES
T WAVE AXIS: 5 DEGREES
VENTRICULAR RATE: 122 BPM
VENTRICULAR RATE: 124 BPM
VENTRICULAR RATE: 124 BPM

## 2025-01-06 PROCEDURE — 93010 ELECTROCARDIOGRAM REPORT: CPT | Performed by: INTERNAL MEDICINE

## 2025-01-06 RX ORDER — NAPROXEN 500 MG/1
500 TABLET ORAL 2 TIMES DAILY WITH MEALS
Qty: 60 TABLET | Refills: 0 | Status: SHIPPED | OUTPATIENT
Start: 2025-01-06

## 2025-01-06 NOTE — ED NOTES
Family at bedside, updated on plan of care. Pt offers no additional questions. Safety parameters maintained, call bell within reach.     Bridgett Juarez RN  01/05/25 [id number]

## 2025-01-06 NOTE — ED PROVIDER NOTES
Time reflects when diagnosis was documented in both MDM as applicable and the Disposition within this note       Time User Action Codes Description Comment    1/6/2025 12:16 AM Archie Rojas [K04.7] Dental abscess     1/6/2025 12:16 AM Archie Rojas [L03.211] Facial cellulitis           ED Disposition       ED Disposition   Discharge    Condition   Stable    Date/Time   Mon Jan 6, 2025 12:16 AM    Comment   Idris Frankel discharge to home/self care.                   Assessment & Plan       Medical Decision Making   No focal neurological defects.  No visual disturbance.  No hearing loss/tinnitus/vertigo.  No pain behind the ear.  No parotid gland tenderness. No neck pain or trouble swallowing.  No deviation of the tonsils to suggest peritonsillar abscess.  No obvious drainable intraoral abscess.  No facial rash or blisters.  No woodiness or swelling underneath the tongue.  No claudication when chewing.  No headache.  Given stat referral to maxillofacial surgery at [place].  Patient lives with family members who state they will happily bring him back if he starts to worsen or become more sick.  Sugars significantly improved with IV rehydration.  Repeatedly stressed the importance of tight glycemic control during the course of this illness.  Patient was offered admission for further observation, treatment and monitoring.  Patient politely declined stating that they felt better and would prefer to be discharged and follow-up with [facility] family doctor.  Lengthy discussion with the patient in regards to specific signs and symptoms to watch out for that warrant prompt return to the ED.  Patient was informed of the risk of diagnostic uncertainty.  Patient expressed their understanding of these instructions, all questions were answered, they were agreeable to plan and very thankful for care.      Amount and/or Complexity of Data Reviewed  Independent Historian: caregiver  External Data Reviewed: notes.  Labs:  ordered.  Radiology: ordered.  ECG/medicine tests: ordered and independent interpretation performed.  Discussion of management or test interpretation with external provider(s): Maxillofacial surgeon on-call Dann Pichardo    Risk  OTC drugs.  Prescription drug management.  Parenteral controlled substances.             Medications   sodium chloride (PF) 0.9 % injection 3 mL (has no administration in time range)   sodium chloride 0.9 % bolus 1,000 mL (0 mL Intravenous Stopped 1/5/25 2251)     Followed by   sodium chloride 0.9 % bolus 1,000 mL (0 mL Intravenous Stopped 1/5/25 2323)     Followed by   sodium chloride 0.9 % bolus 1,000 mL (0 mL Intravenous Stopped 1/5/25 2251)   ampicillin-sulbactam (UNASYN) 3 g in sodium chloride 0.9 % 100 mL IVPB (0 g Intravenous Stopped 1/5/25 2323)   HYDROmorphone HCl (DILAUDID) injection 0.2 mg (0.2 mg Intravenous Given 1/5/25 2150)   iohexol (OMNIPAQUE) 350 MG/ML injection (MULTI-DOSE) 100 mL (85 mL Intravenous Given 1/5/25 2227)   ketorolac (TORADOL) injection 15 mg (15 mg Intravenous Given 1/5/25 2327)   acetaminophen (Ofirmev) injection 1,000 mg (0 mg Intravenous Stopped 1/5/25 2347)       ED Risk Strat Scores                                              History of Present Illness       Chief Complaint   Patient presents with    Facial Swelling    Dental Pain     Has been having issues with his front teeth; was put on antibiotics by his family doctor; states this all started on new years margarita;  states palpitations and fever    Urinary Frequency       Past Medical History:   Diagnosis Date    Anxiety     Coronary artery disease     Diabetes mellitus (HCC)     Hypertension     SVT (supraventricular tachycardia) (HCC) 2020      Past Surgical History:   Procedure Laterality Date    CARDIAC ELECTROPHYSIOLOGY STUDY AND ABLATION  2020    FOR SVT in [facility]    CARPAL TUNNEL RELEASE Bilateral     CERVICAL DISC SURGERY        Family History   Problem Relation Age of Onset    COPD  Mother     Acute lymphoblastic leukemia Father       Social History     Tobacco Use    Smoking status: Never     Passive exposure: Past    Smokeless tobacco: Never   Vaping Use    Vaping status: Never Used   Substance Use Topics    Alcohol use: Never    Drug use: Never      E-Cigarette/Vaping    E-Cigarette Use Never User       E-Cigarette/Vaping Substances    Nicotine No     THC No     CBD No     Flavoring No     Other No     Unknown No       I have reviewed and agree with the history as documented.     Dental pain is now moderate to severe.  Worse with palpation and eating.  Nothing seems to make it better.  Does not seem to have been taking consistent home medications for the discomfort.  Does not have a scheduled dental follow-up visit.  Notes his sugars have been higher than baseline.  Notes that he has been drinking soda for hydration.  Decreased solid intake because he feels less hungry.  Notes increase in urinary frequency.        Review of Systems   Constitutional:  Positive for chills and fever.   Eyes:  Negative for visual disturbance.   Respiratory:  Negative for shortness of breath.    Cardiovascular:  Negative for chest pain.   Gastrointestinal:  Negative for abdominal distention and abdominal pain.   Endocrine: Negative for polyuria.   Genitourinary:  Negative for decreased urine volume and dysuria.   Neurological:  Negative for dizziness and syncope.           Objective       ED Triage Vitals   Temperature Pulse Blood Pressure Respirations SpO2 Patient Position - Orthostatic VS   01/05/25 2049 01/05/25 2049 01/05/25 2049 01/05/25 2049 01/05/25 2049 01/05/25 2315   97.6 °F (36.4 °C) (!) 123 (!) 157/104 19 98 % Lying      Temp src Heart Rate Source BP Location FiO2 (%) Pain Score    -- 01/05/25 2049 01/05/25 2330 -- 01/05/25 2049     Monitor Right arm  8      Vitals      Date and Time Temp Pulse SpO2 Resp BP Pain Score FACES Pain Rating User   01/06/25 0000 -- 110 97 % 20 153/84 4 -- KO   01/05/25 2345  -- 112 97 % 22 161/94 -- -- KO   01/05/25 2330 -- 110 97 % 20 180/93 10 - Worst Possible Pain -- KO   01/05/25 2315 -- 112 95 % 30 182/89 10 - Worst Possible Pain -- KO   01/05/25 2300 -- 117 98 % 24 176/89 -- -- KO   01/05/25 2200 -- 120 98 % 19 173/92 -- -- RTM   01/05/25 2150 -- -- -- -- -- 8 -- RTM   01/05/25 2115 -- 122 96 % 22 154/89 -- -- RTM   01/05/25 2100 -- 123 97 % 22 157/103 -- -- RT   01/05/25 2049 97.6 °F (36.4 °C) 123 98 % 19 157/104 8 -- AF            Physical Exam  Constitutional:       General: He is not in acute distress.     Appearance: He is well-developed.   HENT:      Head: Normocephalic and atraumatic.      Right Ear: External ear normal.      Left Ear: External ear normal.      Nose: Nose normal.      Mouth/Throat:      Comments: Uvula midline.  Poor dentition.  Tenderness over superior frontal teeth.  Impossible number due to damage and number of missing teeth.  Mild swelling over the philtrum area  Eyes:      Conjunctiva/sclera: Conjunctivae normal.      Pupils: Pupils are equal, round, and reactive to light.   Cardiovascular:      Rate and Rhythm: Regular rhythm. Tachycardia present.      Heart sounds: Normal heart sounds.   Pulmonary:      Effort: Pulmonary effort is normal. No respiratory distress.      Breath sounds: Normal breath sounds.   Abdominal:      General: Bowel sounds are normal.      Palpations: Abdomen is soft.   Musculoskeletal:         General: Normal range of motion.      Cervical back: Normal range of motion and neck supple.   Skin:     General: Skin is warm and dry.   Neurological:      Mental Status: He is alert and oriented to person, place, and time.   Psychiatric:         Behavior: Behavior normal.         Thought Content: Thought content normal.         Judgment: Judgment normal.         Results Reviewed       Procedure Component Value Units Date/Time    HS Troponin I 2hr [[id number]] Collected: 01/05/25 7957    Lab Status: In process Specimen: Blood from Arm,  Right Updated: 01/05/25 2353    Fingerstick Glucose (POCT) [[id number]]  (Abnormal) Collected: 01/05/25 2337    Lab Status: Final result Specimen: Blood Updated: 01/05/25 2338     POC Glucose 286 mg/dl     HS Troponin I 4hr [[id number]]     Lab Status: No result Specimen: Blood     UA w Reflex to Microscopic w Reflex to Culture [[id number]]  (Abnormal) Collected: 01/05/25 2252    Lab Status: Final result Specimen: Urine, Clean Catch Updated: 01/05/25 2315     Color, UA Yellow     Clarity, UA Clear     Specific Gravity, UA 1.015     pH, UA 5.5     Leukocytes, UA Negative     Nitrite, UA Negative     Protein, UA Negative mg/dl      Glucose, UA 3+ mg/dl      Ketones, UA 40 (2+) mg/dl      Urobilinogen, UA 0.2 E.U./dl      Bilirubin, UA Negative     Occult Blood, UA Negative    FLU/RSV/COVID - if FLU/RSV clinically relevant (2hr TAT) [[id number]]  (Normal) Collected: 01/05/25 2128    Lab Status: Final result Specimen: Nares from Nose Updated: 01/05/25 2224     SARS-CoV-2 Negative     INFLUENZA A PCR Negative     INFLUENZA B PCR Negative     RSV PCR Negative    Narrative:      This test has been performed using the CoV-2/Flu/RSV plus assay on the [employer] GeneXpert platform. This test has been validated by the  and verified by the performing laboratory.     This test is designed to amplify and detect the following: nucleocapsid (N), envelope (E), and RNA-dependent RNA polymerase (RdRP) genes of the SARS-CoV-2 genome; matrix (M), basic polymerase (PB2), and acidic protein (PA) segments of the influenza A genome; matrix (M) and non-structural protein (NS) segments of the influenza B genome, and the nucleocapsid genes of RSV A and RSV B.     Positive results are indicative of the presence of Flu A, Flu B, RSV, and/or SARS-CoV-2 RNA. Positive results for SARS-CoV-2 or suspected novel influenza should be reported to state, local, or federal health departments according to local reporting requirements.      All results  should be assessed in conjunction with clinical presentation and other laboratory markers for clinical management.     FOR PEDIATRIC PATIENTS - copy/paste COVID Guidelines URL to browser: [email or web address]       TSH, 3rd generation [[id number]]  (Normal) Collected: 01/05/25 2128    Lab Status: Final result Specimen: Blood from Arm, Left Updated: 01/05/25 2215     TSH 3RD GENERATON 2.466 uIU/mL     Procalcitonin [[id number]]  (Normal) Collected: 01/05/25 2128    Lab Status: Final result Specimen: Blood from Arm, Left Updated: 01/05/25 2208     Procalcitonin 0.07 ng/ml     Comprehensive metabolic panel [[id number]]  (Abnormal) Collected: 01/05/25 2128    Lab Status: Final result Specimen: Blood from Arm, Left Updated: 01/05/25 2207     Sodium 130 mmol/L      Potassium 3.7 mmol/L      Chloride 97 mmol/L      CO2 18 mmol/L      ANION GAP 15 mmol/L      BUN 10 mg/dL      Creatinine 0.94 mg/dL      Glucose 453 mg/dL      Calcium 9.7 mg/dL      AST 7 U/L      ALT 10 U/L      Alkaline Phosphatase 116 U/L      Total Protein 7.9 g/dL      Albumin 4.5 g/dL      Total Bilirubin 0.71 mg/dL      eGFR 88 ml/min/1.73sq m     Narrative:      National Kidney Disease Foundation guidelines for Chronic Kidney Disease (CKD):     Stage 1 with normal or high GFR (GFR > 90 mL/min/1.73 square meters)    Stage 2 Mild CKD (GFR = 60-89 mL/min/1.73 square meters)    Stage 3A Moderate CKD (GFR = 45-59 mL/min/1.73 square meters)    Stage 3B Moderate CKD (GFR = 30-44 mL/min/1.73 square meters)    Stage 4 Severe CKD (GFR = 15-29 mL/min/1.73 square meters)    Stage 5 End Stage CKD (GFR <15 mL/min/1.73 square meters)  Note: GFR calculation is accurate only with a steady state creatinine    HS Troponin 0hr (reflex protocol) [[id number]]  (Normal) Collected: 01/05/25 2128    Lab Status: Final result Specimen: Blood from Arm, Left Updated: 01/05/25 2205     hs TnI 0hr 7 ng/L     Lactic acid [[id number]]   (Normal) Collected: 01/05/25 2128    Lab Status: Final result Specimen: Blood from Arm, Left Updated: 01/05/25 2202     LACTIC ACID 1.2 mmol/L     Narrative:      Result may be elevated if tourniquet was used during collection.    Beta Hydroxybutyrate [[id number]]  (Abnormal) Collected: 01/05/25 2128    Lab Status: Final result Specimen: Blood from Arm, Left Updated: 01/05/25 2201     Beta- Hydroxybutyrate 2.87 mmol/L     Protime-INR [[id number]]  (Normal) Collected: 01/05/25 2128    Lab Status: Final result Specimen: Blood from Arm, Left Updated: 01/05/25 2156     Protime 13.9 seconds      INR 1.02    Narrative:      INR Therapeutic Range    Indication                                             INR Range      Atrial Fibrillation                                               2.0-3.0  Hypercoagulable State                                    2.0.2.3  Left Ventricular Asist Device                            2.0-3.0  Mechanical Heart Valve                                  -    Aortic(with afib, MI, embolism, HF, LA enlargement,    and/or coagulopathy)                                     2.0-3.0 (2.5-3.5)     Mitral                                                             2.5-3.5  Prosthetic/Bioprosthetic Heart Valve               2.0-3.0  Venous thromboembolism (VTE: VT, PE        2.0-3.0    APTT [[id number]]  (Normal) Collected: 01/05/25 2128    Lab Status: Final result Specimen: Blood from Arm, Left Updated: 01/05/25 2156     PTT 26 seconds     Blood gas, venous [[id number]]  (Abnormal) Collected: 01/05/25 2128    Lab Status: Final result Specimen: Blood from Arm, Left Updated: 01/05/25 2145     pH, Ede 7.449     pCO2, Ede 25.2 mm Hg      pO2, Ede 64.8 mm Hg      HCO3, Ede 17.1 mmol/L      Base Excess, Ede -4.7 mmol/L      O2 Content, Ede 21.6 ml/dL      O2 HGB, VENOUS 92.2 %     CBC and differential [[id number]]  (Abnormal) Collected: 01/05/25 2128    Lab Status: Final result Specimen: Blood from Arm, Left Updated:  01/05/25 2144     WBC 14.92 Thousand/uL      RBC 5.18 Million/uL      Hemoglobin 16.0 g/dL      Hematocrit 45.7 %      MCV 88 fL      MCH 30.9 pg      MCHC 35.0 g/dL      RDW 12.4 %      MPV 8.9 fL      Platelets 499 Thousands/uL      nRBC 0 /100 WBCs      Segmented % 71 %      Immature Grans % 1 %      Lymphocytes % 17 %      Monocytes % 10 %      Eosinophils Relative 1 %      Basophils Relative 0 %      Absolute Neutrophils 10.62 Thousands/µL      Absolute Immature Grans 0.07 Thousand/uL      Absolute Lymphocytes 2.54 Thousands/µL      Absolute Monocytes 1.54 Thousand/µL      Eosinophils Absolute 0.09 Thousand/µL      Basophils Absolute 0.06 Thousands/µL     Blood culture #1 [[id number]] Collected: 01/05/25 2128    Lab Status: In process Specimen: Blood from Arm, Left Updated: 01/05/25 2139    Blood culture #2 [[id number]] Collected: 01/05/25 2128    Lab Status: In process Specimen: Blood from Arm, Right Updated: 01/05/25 2139            CT facial bones with contrast   Final Interpretation by Dustin Collins MD (01/05 2337)      Moderate soft tissue and subcutaneous inflammatory stranding involving the anterior midline maxillary region with an associated small subperiosteal abscess, as described above.         Workstation performed: [id number]         X-ray chest 1 view portable    (Results Pending)       Procedures    ED Medication and Procedure Management   Prior to Admission Medications   Prescriptions Last Dose Informant Patient Reported? Taking?   Blood Glucose Monitoring Suppl (OneTouch Verio Reflect) w/Device KIT   No No   Sig: Check blood sugars once daily. Please substitute with appropriate alternative as covered by patient's insurance. Dx: E11.65   OneTouch Delica Lancets 33G MISC   No No   Sig: Check blood sugars once daily. Please substitute with appropriate alternative as covered by patient's insurance. Dx: E11.65   PARoxetine (PAXIL) 40 MG tablet   No No   Sig: Take 1 tablet (40 mg total) by mouth  every morning   amoxicillin (AMOXIL) 500 MG tablet   No No   Sig: Take 1 tablet (500 mg total) by mouth 3 (three) times a day for 10 days   atorvastatin (LIPITOR) 40 mg tablet   Yes No   Sig: Take 40 mg by mouth daily   glucose blood (OneTouch Verio) test strip   No No   Sig: Check blood sugars once daily. Please substitute with appropriate alternative as covered by patient's insurance. Dx: E11.65   metoprolol succinate (TOPROL-XL) 25 mg 24 hr tablet   No No   Sig: Take 1 tablet (25 mg total) by mouth daily   semaglutide (Rybelsus) 3 MG tablet   No No   Sig: TAKE 1 TABLET BY MOUTH DAILY TAKE ON EMPTY STOMACH WITH 4 OZ WATER, WAIT 30 MINUTES BEFORE EATING   semaglutide (Rybelsus) 7 MG tablet   No No   Sig: Take 1 tablet (7 mg total) by mouth daily Take on an empty stomach with 4 oz water, and wait 30 minutes before eating Do not start before November 23, 2024.   sildenafil (VIAGRA) 100 mg tablet   No No   Sig: Take 1 tablet (100 mg total) by mouth daily as needed for erectile dysfunction   valsartan (DIOVAN) 80 mg tablet   No No   Sig: Take 1 tablet (80 mg total) by mouth daily      Facility-Administered Medications: None     Patient's Medications   Discharge Prescriptions    AMOXICILLIN-CLAVULANATE (AUGMENTIN) 875-125 MG PER TABLET    Take 1 tablet by mouth 2 (two) times a day for 7 days       Start Date: 1/6/2025  End Date: 1/13/2025       Order Dose: 1 tablet       Quantity: 14 tablet    Refills: 0    NAPROXEN (NAPROSYN) 500 MG TABLET    Take 1 tablet (500 mg total) by mouth 2 (two) times a day with meals       Start Date: 1/6/2025  End Date: --       Order Dose: 500 mg       Quantity: 60 tablet    Refills: 0       ED SEPSIS DOCUMENTATION   Time reflects when diagnosis was documented in both MDM as applicable and the Disposition within this note       Time User Action Codes Description Comment    1/6/2025 12:16 AM Archie Rojas [K04.7] Dental abscess     1/6/2025 12:16 AM Archie Rojas [L03.211] Facial  cellulitis                  Archie Rojas MD  01/06/25 0029       Archie Rojas MD  01/06/25 0030

## 2025-01-11 LAB
BACTERIA BLD CULT: NORMAL
BACTERIA BLD CULT: NORMAL

## 2025-01-13 NOTE — PATIENT PROFILE ADULT. - ACCEPTABLE
What Type Of Note Output Would You Prefer (Optional)?: Standard Output
Hpi Title: Evaluation of Skin Lesions
0

## 2025-01-16 ENCOUNTER — TELEPHONE (OUTPATIENT)
Dept: FAMILY MEDICINE CLINIC | Facility: CLINIC | Age: 60
End: 2025-01-16

## 2025-01-16 DIAGNOSIS — I10 PRIMARY HYPERTENSION: Primary | ICD-10-CM

## 2025-01-16 DIAGNOSIS — E78.2 DM TYPE 2 WITH DIABETIC MIXED HYPERLIPIDEMIA  (HCC): Primary | ICD-10-CM

## 2025-01-16 DIAGNOSIS — I47.10 SVT (SUPRAVENTRICULAR TACHYCARDIA) (HCC): ICD-10-CM

## 2025-01-16 DIAGNOSIS — I10 PRIMARY HYPERTENSION: ICD-10-CM

## 2025-01-16 DIAGNOSIS — F41.9 ANXIETY: ICD-10-CM

## 2025-01-16 DIAGNOSIS — E11.9 TYPE 2 DIABETES, HBA1C GOAL < 7% (HCC): ICD-10-CM

## 2025-01-16 DIAGNOSIS — E11.69 DM TYPE 2 WITH DIABETIC MIXED HYPERLIPIDEMIA  (HCC): Primary | ICD-10-CM

## 2025-01-16 RX ORDER — PIOGLITAZONE 30 MG/1
30 TABLET ORAL DAILY
Qty: 100 TABLET | Refills: 3 | Status: SHIPPED | OUTPATIENT
Start: 2025-01-16

## 2025-01-16 RX ORDER — DILTIAZEM HYDROCHLORIDE 300 MG/1
300 CAPSULE, COATED, EXTENDED RELEASE ORAL DAILY
Qty: 90 CAPSULE | Refills: 0 | Status: SHIPPED | OUTPATIENT
Start: 2025-01-16 | End: 2025-01-16 | Stop reason: SDUPTHER

## 2025-01-16 RX ORDER — BLOOD-GLUCOSE METER
KIT MISCELLANEOUS
Qty: 1 KIT | Refills: 0 | Status: SHIPPED | OUTPATIENT
Start: 2025-01-16

## 2025-01-16 RX ORDER — DILTIAZEM HYDROCHLORIDE 300 MG/1
300 CAPSULE, COATED, EXTENDED RELEASE ORAL DAILY
Qty: 90 CAPSULE | Refills: 0 | Status: SHIPPED | OUTPATIENT
Start: 2025-01-16

## 2025-01-16 NOTE — TELEPHONE ENCOUNTER
Patient called the RX Refill Line. Message is being forwarded to the office.     Patient is requesting prescriptions for Pioglitazone 30 mg                                                               Diltiazem 300 mg.    Patient states he is out of medication     Please contact patient at  [phone]

## 2025-01-17 RX ORDER — BLOOD SUGAR DIAGNOSTIC
STRIP MISCELLANEOUS
Qty: 100 EACH | Refills: 2 | Status: SHIPPED | OUTPATIENT
Start: 2025-01-17

## 2025-01-17 RX ORDER — PAROXETINE 40 MG/1
40 TABLET, FILM COATED ORAL EVERY MORNING
Qty: 90 TABLET | Refills: 1 | Status: SHIPPED | OUTPATIENT
Start: 2025-01-17

## 2025-01-17 RX ORDER — METOPROLOL SUCCINATE 25 MG/1
25 TABLET, EXTENDED RELEASE ORAL DAILY
Qty: 90 TABLET | Refills: 1 | Status: SHIPPED | OUTPATIENT
Start: 2025-01-17

## 2025-02-24 ENCOUNTER — RA CDI HCC (OUTPATIENT)
Dept: OTHER | Facility: HOSPITAL | Age: 60
End: 2025-02-24

## 2025-03-25 NOTE — ED ADULT NURSE NOTE - NS ED NURSE DISCH DISPOSITION
Repeat TSH and free T4 ordered      Orders:    TSH, 3rd generation; Future    T4, free; Future     Discharged

## 2025-04-14 DIAGNOSIS — I10 PRIMARY HYPERTENSION: ICD-10-CM

## 2025-04-15 RX ORDER — DILTIAZEM HYDROCHLORIDE 300 MG/1
300 CAPSULE, COATED, EXTENDED RELEASE ORAL DAILY
Qty: 90 CAPSULE | Refills: 0 | Status: SHIPPED | OUTPATIENT
Start: 2025-04-15

## 2025-05-29 ENCOUNTER — DOCUMENTATION (OUTPATIENT)
Dept: ADMINISTRATIVE | Facility: OTHER | Age: 60
End: 2025-05-29

## 2025-05-29 NOTE — PROGRESS NOTES
05/29/25 9:21 AM     DM A1c outreach is not required, PATIENT SEES [facility] FAMILY MEDICINE     Thank you.  Maris Short MA  [facility]

## 2025-07-19 DIAGNOSIS — I47.10 SVT (SUPRAVENTRICULAR TACHYCARDIA) (HCC): ICD-10-CM

## 2025-07-19 DIAGNOSIS — I10 PRIMARY HYPERTENSION: ICD-10-CM

## 2025-07-20 DIAGNOSIS — F41.9 ANXIETY: ICD-10-CM

## 2025-07-20 RX ORDER — METOPROLOL SUCCINATE 25 MG/1
25 TABLET, EXTENDED RELEASE ORAL DAILY
Qty: 30 TABLET | Refills: 0 | Status: SHIPPED | OUTPATIENT
Start: 2025-07-20

## 2025-07-21 DIAGNOSIS — I10 PRIMARY HYPERTENSION: ICD-10-CM

## 2025-07-21 RX ORDER — PAROXETINE 40 MG/1
40 TABLET, FILM COATED ORAL EVERY MORNING
Qty: 90 TABLET | Refills: 3 | Status: SHIPPED | OUTPATIENT
Start: 2025-07-21

## 2025-07-21 NOTE — TELEPHONE ENCOUNTER
Patient needs an appointment. Please contact the patient to schedule an appointment.Courtesy refill provided

## 2025-07-21 NOTE — TELEPHONE ENCOUNTER
LEFT A VM FOR PT TO CALL BACK AND SCHEDULE AN APPT WITH DR ROSADO TO BE SEEN BEFORE NEXT MEDICATION REFILL IS DUE

## 2025-07-22 RX ORDER — DILTIAZEM HYDROCHLORIDE 300 MG/1
300 CAPSULE, COATED, EXTENDED RELEASE ORAL DAILY
Qty: 30 CAPSULE | Refills: 0 | Status: SHIPPED | OUTPATIENT
Start: 2025-07-22

## 2025-08-15 ENCOUNTER — OFFICE VISIT (OUTPATIENT)
Dept: FAMILY MEDICINE CLINIC | Facility: CLINIC | Age: 60
End: 2025-08-15
Payer: COMMERCIAL

## 2025-08-15 ENCOUNTER — APPOINTMENT (OUTPATIENT)
Age: 60
End: 2025-08-15
Attending: INTERNAL MEDICINE
Payer: COMMERCIAL

## 2025-08-15 PROBLEM — E66.813 CLASS 3 SEVERE OBESITY WITH BODY MASS INDEX (BMI) OF 40.0 TO 44.9 IN ADULT: Status: RESOLVED | Noted: 2024-07-23 | Resolved: 2025-08-15

## 2025-08-18 ENCOUNTER — RESULTS FOLLOW-UP (OUTPATIENT)
Dept: FAMILY MEDICINE CLINIC | Facility: CLINIC | Age: 60
End: 2025-08-18

## 2025-08-18 DIAGNOSIS — E11.9 TYPE 2 DIABETES, HBA1C GOAL < 7% (HCC): Primary | ICD-10-CM

## 2025-08-18 RX ORDER — GLIPIZIDE 5 MG/1
5 TABLET ORAL
Qty: 100 TABLET | Refills: 3 | Status: SHIPPED | OUTPATIENT
Start: 2025-08-18